# Patient Record
Sex: MALE | Race: WHITE | Employment: OTHER | URBAN - METROPOLITAN AREA
[De-identification: names, ages, dates, MRNs, and addresses within clinical notes are randomized per-mention and may not be internally consistent; named-entity substitution may affect disease eponyms.]

---

## 2017-11-15 PROBLEM — R07.9 CHEST PAIN: Status: ACTIVE | Noted: 2017-11-15

## 2017-12-10 PROBLEM — R07.9 CHEST PAIN: Status: RESOLVED | Noted: 2017-11-15 | Resolved: 2017-12-10

## 2017-12-10 PROBLEM — K21.9 GASTROESOPHAGEAL REFLUX DISEASE WITHOUT ESOPHAGITIS: Chronic | Status: ACTIVE | Noted: 2017-12-10

## 2017-12-10 PROBLEM — J40 BRONCHITIS: Status: ACTIVE | Noted: 2017-12-10

## 2017-12-10 PROBLEM — I10 ESSENTIAL HYPERTENSION: Chronic | Status: ACTIVE | Noted: 2017-12-10

## 2018-04-02 ENCOUNTER — HOSPITAL ENCOUNTER (EMERGENCY)
Age: 68
Discharge: HOME | End: 2018-04-03
Attending: EMERGENCY MEDICINE
Payer: MEDICARE

## 2018-04-02 DIAGNOSIS — R19.7 NAUSEA VOMITING AND DIARRHEA: ICD-10-CM

## 2018-04-02 DIAGNOSIS — K52.9 COLITIS, ACUTE: Primary | ICD-10-CM

## 2018-04-02 DIAGNOSIS — R11.2 NAUSEA VOMITING AND DIARRHEA: ICD-10-CM

## 2018-04-02 LAB
ALBUMIN SERPL-MCNC: 4.4 G/DL (ref 3.5–5.2)
ALP BLD-CCNC: 94 U/L (ref 40–129)
ALT SERPL-CCNC: 25 U/L (ref 0–40)
ANION GAP SERPL CALCULATED.3IONS-SCNC: 14 MMOL/L (ref 7–16)
AST SERPL-CCNC: 18 U/L (ref 0–39)
BASOPHILS ABSOLUTE: 0.09 E9/L (ref 0–0.2)
BASOPHILS RELATIVE PERCENT: 0.5 % (ref 0–2)
BILIRUB SERPL-MCNC: 0.6 MG/DL (ref 0–1.2)
BILIRUBIN DIRECT: <0.2 MG/DL (ref 0–0.3)
BILIRUBIN, INDIRECT: NORMAL MG/DL (ref 0–1)
BUN BLDV-MCNC: 21 MG/DL (ref 8–23)
CALCIUM SERPL-MCNC: 9.3 MG/DL (ref 8.6–10.2)
CHLORIDE BLD-SCNC: 100 MMOL/L (ref 98–107)
CO2: 28 MMOL/L (ref 22–29)
CREAT SERPL-MCNC: 0.9 MG/DL (ref 0.7–1.2)
EKG ATRIAL RATE: 95 BPM
EKG P AXIS: 48 DEGREES
EKG P-R INTERVAL: 142 MS
EKG Q-T INTERVAL: 334 MS
EKG QRS DURATION: 88 MS
EKG QTC CALCULATION (BAZETT): 419 MS
EKG R AXIS: 0 DEGREES
EKG T AXIS: 8 DEGREES
EKG VENTRICULAR RATE: 95 BPM
EOSINOPHILS ABSOLUTE: 0 E9/L (ref 0.05–0.5)
EOSINOPHILS RELATIVE PERCENT: 0.3 % (ref 0–6)
GFR AFRICAN AMERICAN: >60
GFR NON-AFRICAN AMERICAN: >60 ML/MIN/1.73
GLUCOSE BLD-MCNC: 157 MG/DL (ref 74–109)
HCT VFR BLD CALC: 45.2 % (ref 37–54)
HEMOGLOBIN: 15.4 G/DL (ref 12.5–16.5)
LACTIC ACID: 2.1 MMOL/L (ref 0.5–2.2)
LIPASE: 26 U/L (ref 13–60)
LYMPHOCYTES ABSOLUTE: 0.19 E9/L (ref 1.5–4)
LYMPHOCYTES RELATIVE PERCENT: 1 % (ref 20–42)
MCH RBC QN AUTO: 31.2 PG (ref 26–35)
MCHC RBC AUTO-ENTMCNC: 34.1 % (ref 32–34.5)
MCV RBC AUTO: 91.7 FL (ref 80–99.9)
MONOCYTES ABSOLUTE: 0.38 E9/L (ref 0.1–0.95)
MONOCYTES RELATIVE PERCENT: 2 % (ref 2–12)
NEUTROPHILS ABSOLUTE: 18.33 E9/L (ref 1.8–7.3)
NEUTROPHILS RELATIVE PERCENT: 96.5 % (ref 43–80)
OVALOCYTES: ABNORMAL
PDW BLD-RTO: 12.9 FL (ref 11.5–15)
PLATELET # BLD: 193 E9/L (ref 130–450)
PMV BLD AUTO: 10.3 FL (ref 7–12)
POIKILOCYTES: ABNORMAL
POLYCHROMASIA: ABNORMAL
POTASSIUM SERPL-SCNC: 4.1 MMOL/L (ref 3.5–5)
RBC # BLD: 4.93 E12/L (ref 3.8–5.8)
SODIUM BLD-SCNC: 142 MMOL/L (ref 132–146)
STOMATOCYTES: ABNORMAL
TARGET CELLS: ABNORMAL
TEAR DROP CELLS: ABNORMAL
TOTAL PROTEIN: 7.5 G/DL (ref 6.4–8.3)
TROPONIN: <0.01 NG/ML (ref 0–0.03)
WBC # BLD: 18.9 E9/L (ref 4.5–11.5)

## 2018-04-02 PROCEDURE — 84484 ASSAY OF TROPONIN QUANT: CPT

## 2018-04-02 PROCEDURE — 83605 ASSAY OF LACTIC ACID: CPT

## 2018-04-02 PROCEDURE — 93005 ELECTROCARDIOGRAM TRACING: CPT | Performed by: EMERGENCY MEDICINE

## 2018-04-02 PROCEDURE — G0328 FECAL BLOOD SCRN IMMUNOASSAY: HCPCS

## 2018-04-02 PROCEDURE — 87045 FECES CULTURE AEROBIC BACT: CPT

## 2018-04-02 PROCEDURE — 36415 COLL VENOUS BLD VENIPUNCTURE: CPT

## 2018-04-02 PROCEDURE — 83690 ASSAY OF LIPASE: CPT

## 2018-04-02 PROCEDURE — 2580000003 HC RX 258: Performed by: EMERGENCY MEDICINE

## 2018-04-02 PROCEDURE — 99284 EMERGENCY DEPT VISIT MOD MDM: CPT

## 2018-04-02 PROCEDURE — 87077 CULTURE AEROBIC IDENTIFY: CPT

## 2018-04-02 PROCEDURE — 80048 BASIC METABOLIC PNL TOTAL CA: CPT

## 2018-04-02 PROCEDURE — 82705 FATS/LIPIDS FECES QUAL: CPT

## 2018-04-02 PROCEDURE — 96374 THER/PROPH/DIAG INJ IV PUSH: CPT

## 2018-04-02 PROCEDURE — 80076 HEPATIC FUNCTION PANEL: CPT

## 2018-04-02 PROCEDURE — 87329 GIARDIA AG IA: CPT

## 2018-04-02 PROCEDURE — C9113 INJ PANTOPRAZOLE SODIUM, VIA: HCPCS | Performed by: EMERGENCY MEDICINE

## 2018-04-02 PROCEDURE — 6360000002 HC RX W HCPCS: Performed by: EMERGENCY MEDICINE

## 2018-04-02 PROCEDURE — 85025 COMPLETE CBC W/AUTO DIFF WBC: CPT

## 2018-04-02 PROCEDURE — 87425 ROTAVIRUS AG IA: CPT

## 2018-04-02 PROCEDURE — 87324 CLOSTRIDIUM AG IA: CPT

## 2018-04-02 RX ORDER — 0.9 % SODIUM CHLORIDE 0.9 %
1000 INTRAVENOUS SOLUTION INTRAVENOUS ONCE
Status: COMPLETED | OUTPATIENT
Start: 2018-04-02 | End: 2018-04-03

## 2018-04-02 RX ORDER — ONDANSETRON 2 MG/ML
4 INJECTION INTRAMUSCULAR; INTRAVENOUS ONCE
Status: COMPLETED | OUTPATIENT
Start: 2018-04-02 | End: 2018-04-02

## 2018-04-02 RX ORDER — PANTOPRAZOLE SODIUM 40 MG/10ML
40 INJECTION, POWDER, LYOPHILIZED, FOR SOLUTION INTRAVENOUS ONCE
Status: COMPLETED | OUTPATIENT
Start: 2018-04-02 | End: 2018-04-02

## 2018-04-02 RX ADMIN — SODIUM CHLORIDE 1000 ML: 900 INJECTION, SOLUTION INTRAVENOUS at 23:01

## 2018-04-02 RX ADMIN — PANTOPRAZOLE SODIUM 40 MG: 40 INJECTION, POWDER, FOR SOLUTION INTRAVENOUS at 23:02

## 2018-04-02 RX ADMIN — ONDANSETRON 4 MG: 2 INJECTION INTRAMUSCULAR; INTRAVENOUS at 23:02

## 2018-04-02 ASSESSMENT — ENCOUNTER SYMPTOMS
EYES NEGATIVE: 1
ABDOMINAL PAIN: 1
BLOOD IN STOOL: 0
NAUSEA: 1
WHEEZING: 0
CHEST TIGHTNESS: 0
COUGH: 0
SHORTNESS OF BREATH: 0
DIARRHEA: 1
ABDOMINAL DISTENTION: 1

## 2018-04-02 ASSESSMENT — PAIN SCALES - GENERAL: PAINLEVEL_OUTOF10: 2

## 2018-04-03 ENCOUNTER — APPOINTMENT (OUTPATIENT)
Dept: CT IMAGING | Age: 68
End: 2018-04-03
Payer: MEDICARE

## 2018-04-03 VITALS
DIASTOLIC BLOOD PRESSURE: 81 MMHG | RESPIRATION RATE: 24 BRPM | HEIGHT: 70 IN | TEMPERATURE: 97.9 F | BODY MASS INDEX: 29.35 KG/M2 | WEIGHT: 205 LBS | HEART RATE: 108 BPM | OXYGEN SATURATION: 95 % | SYSTOLIC BLOOD PRESSURE: 147 MMHG

## 2018-04-03 LAB
GIARDIA ANTIGEN STOOL: NORMAL
OCCULT BLOOD SCREENING: NORMAL
ROTAVIRUS ANTIGEN: NORMAL

## 2018-04-03 PROCEDURE — 74177 CT ABD & PELVIS W/CONTRAST: CPT

## 2018-04-03 PROCEDURE — 6370000000 HC RX 637 (ALT 250 FOR IP): Performed by: EMERGENCY MEDICINE

## 2018-04-03 PROCEDURE — 6360000004 HC RX CONTRAST MEDICATION: Performed by: RADIOLOGY

## 2018-04-03 RX ORDER — METRONIDAZOLE 500 MG/1
500 TABLET ORAL 2 TIMES DAILY
Qty: 20 TABLET | Refills: 0 | Status: SHIPPED | OUTPATIENT
Start: 2018-04-03 | End: 2018-04-13

## 2018-04-03 RX ORDER — CIPROFLOXACIN 500 MG/1
500 TABLET, FILM COATED ORAL ONCE
Status: COMPLETED | OUTPATIENT
Start: 2018-04-03 | End: 2018-04-03

## 2018-04-03 RX ORDER — CIPROFLOXACIN 500 MG/1
500 TABLET, FILM COATED ORAL 2 TIMES DAILY
Qty: 20 TABLET | Refills: 0 | Status: SHIPPED | OUTPATIENT
Start: 2018-04-03 | End: 2018-04-13

## 2018-04-03 RX ORDER — ONDANSETRON 4 MG/1
4 TABLET, FILM COATED ORAL EVERY 8 HOURS PRN
Qty: 20 TABLET | Refills: 0 | Status: SHIPPED | OUTPATIENT
Start: 2018-04-03

## 2018-04-03 RX ORDER — METRONIDAZOLE 500 MG/1
500 TABLET ORAL ONCE
Status: COMPLETED | OUTPATIENT
Start: 2018-04-03 | End: 2018-04-03

## 2018-04-03 RX ADMIN — IOPAMIDOL 80 ML: 755 INJECTION, SOLUTION INTRAVENOUS at 00:09

## 2018-04-03 RX ADMIN — CIPROFLOXACIN HYDROCHLORIDE 500 MG: 500 TABLET, FILM COATED ORAL at 02:25

## 2018-04-03 RX ADMIN — METRONIDAZOLE 500 MG: 500 TABLET, FILM COATED ORAL at 02:25

## 2018-04-04 LAB
FECAL NEUTRAL FAT: NORMAL
FECAL SPLIT FATS: NORMAL

## 2018-04-05 LAB
C DIFFICILE TOXIN, EIA: NORMAL
CULTURE, STOOL: NORMAL

## 2018-12-04 ENCOUNTER — HOSPITAL ENCOUNTER (OUTPATIENT)
Age: 68
Discharge: HOME | End: 2018-12-04
Payer: MEDICARE

## 2018-12-04 LAB
BASOPHILS ABSOLUTE: 0.03 E9/L (ref 0–0.2)
BASOPHILS RELATIVE PERCENT: 0.5 % (ref 0–2)
EOSINOPHILS ABSOLUTE: 0.16 E9/L (ref 0.05–0.5)
EOSINOPHILS RELATIVE PERCENT: 2.7 % (ref 0–6)
FERRITIN: 70 NG/ML
HCT VFR BLD CALC: 35.7 % (ref 37–54)
HEMOGLOBIN: 12.3 G/DL (ref 12.5–16.5)
IMMATURE GRANULOCYTES #: 0.05 E9/L
IMMATURE GRANULOCYTES %: 0.8 % (ref 0–5)
IRON SATURATION: 31 % (ref 20–55)
IRON: 84 MCG/DL (ref 59–158)
LYMPHOCYTES ABSOLUTE: 1.18 E9/L (ref 1.5–4)
LYMPHOCYTES RELATIVE PERCENT: 19.6 % (ref 20–42)
MCH RBC QN AUTO: 32.4 PG (ref 26–35)
MCHC RBC AUTO-ENTMCNC: 34.5 % (ref 32–34.5)
MCV RBC AUTO: 93.9 FL (ref 80–99.9)
MONOCYTES ABSOLUTE: 0.6 E9/L (ref 0.1–0.95)
MONOCYTES RELATIVE PERCENT: 10 % (ref 2–12)
NEUTROPHILS ABSOLUTE: 3.99 E9/L (ref 1.8–7.3)
NEUTROPHILS RELATIVE PERCENT: 66.4 % (ref 43–80)
PDW BLD-RTO: 13.3 FL (ref 11.5–15)
PLATELET # BLD: 156 E9/L (ref 130–450)
PMV BLD AUTO: 10 FL (ref 7–12)
RBC # BLD: 3.8 E12/L (ref 3.8–5.8)
TOTAL IRON BINDING CAPACITY: 270 MCG/DL (ref 250–450)
WBC # BLD: 6 E9/L (ref 4.5–11.5)

## 2018-12-04 PROCEDURE — 82728 ASSAY OF FERRITIN: CPT

## 2018-12-04 PROCEDURE — 85025 COMPLETE CBC W/AUTO DIFF WBC: CPT

## 2018-12-04 PROCEDURE — 36415 COLL VENOUS BLD VENIPUNCTURE: CPT

## 2018-12-04 PROCEDURE — 83550 IRON BINDING TEST: CPT

## 2018-12-04 PROCEDURE — 83540 ASSAY OF IRON: CPT

## 2019-09-07 ENCOUNTER — HOSPITAL ENCOUNTER (OUTPATIENT)
Age: 69
Discharge: HOME | End: 2019-09-07
Payer: MEDICARE

## 2019-09-07 LAB
ALBUMIN SERPL-MCNC: 4.3 G/DL (ref 3.5–5.2)
ALP BLD-CCNC: 86 U/L (ref 40–129)
ALT SERPL-CCNC: 31 U/L (ref 0–40)
ANION GAP SERPL CALCULATED.3IONS-SCNC: 13 MMOL/L (ref 7–16)
AST SERPL-CCNC: 22 U/L (ref 0–39)
BASOPHILS ABSOLUTE: 0.05 E9/L (ref 0–0.2)
BASOPHILS RELATIVE PERCENT: 0.8 % (ref 0–2)
BILIRUB SERPL-MCNC: 0.3 MG/DL (ref 0–1.2)
BUN BLDV-MCNC: 19 MG/DL (ref 8–23)
CALCIUM SERPL-MCNC: 9.2 MG/DL (ref 8.6–10.2)
CHLORIDE BLD-SCNC: 105 MMOL/L (ref 98–107)
CO2: 27 MMOL/L (ref 22–29)
CREAT SERPL-MCNC: 1 MG/DL (ref 0.7–1.2)
EOSINOPHILS ABSOLUTE: 0.22 E9/L (ref 0.05–0.5)
EOSINOPHILS RELATIVE PERCENT: 3.7 % (ref 0–6)
FERRITIN: 114 NG/ML
FOLATE: 17.1 NG/ML (ref 4.8–24.2)
GFR AFRICAN AMERICAN: >60
GFR NON-AFRICAN AMERICAN: >60 ML/MIN/1.73
GLUCOSE BLD-MCNC: 103 MG/DL (ref 74–99)
HCT VFR BLD CALC: 37.1 % (ref 37–54)
HEMOGLOBIN: 12.6 G/DL (ref 12.5–16.5)
IGA: 237 MG/DL (ref 70–400)
IMMATURE GRANULOCYTES #: 0.13 E9/L
IMMATURE GRANULOCYTES %: 2.2 % (ref 0–5)
IRON SATURATION: 32 % (ref 20–55)
IRON: 89 MCG/DL (ref 59–158)
LYMPHOCYTES ABSOLUTE: 1.12 E9/L (ref 1.5–4)
LYMPHOCYTES RELATIVE PERCENT: 18.6 % (ref 20–42)
MCH RBC QN AUTO: 31.8 PG (ref 26–35)
MCHC RBC AUTO-ENTMCNC: 34 % (ref 32–34.5)
MCV RBC AUTO: 93.7 FL (ref 80–99.9)
MONOCYTES ABSOLUTE: 0.58 E9/L (ref 0.1–0.95)
MONOCYTES RELATIVE PERCENT: 9.6 % (ref 2–12)
NEUTROPHILS ABSOLUTE: 3.92 E9/L (ref 1.8–7.3)
NEUTROPHILS RELATIVE PERCENT: 65.1 % (ref 43–80)
PDW BLD-RTO: 13.2 FL (ref 11.5–15)
PLATELET # BLD: 185 E9/L (ref 130–450)
PMV BLD AUTO: 10 FL (ref 7–12)
POTASSIUM SERPL-SCNC: 3.7 MMOL/L (ref 3.5–5)
RBC # BLD: 3.96 E12/L (ref 3.8–5.8)
SODIUM BLD-SCNC: 145 MMOL/L (ref 132–146)
TOTAL IRON BINDING CAPACITY: 280 MCG/DL (ref 250–450)
TOTAL PROTEIN: 7.3 G/DL (ref 6.4–8.3)
VITAMIN B-12: 439 PG/ML (ref 211–946)
WBC # BLD: 6 E9/L (ref 4.5–11.5)

## 2019-09-07 PROCEDURE — 80053 COMPREHEN METABOLIC PANEL: CPT

## 2019-09-07 PROCEDURE — 85025 COMPLETE CBC W/AUTO DIFF WBC: CPT

## 2019-09-07 PROCEDURE — 36415 COLL VENOUS BLD VENIPUNCTURE: CPT

## 2019-09-07 PROCEDURE — 82607 VITAMIN B-12: CPT

## 2019-09-07 PROCEDURE — 83550 IRON BINDING TEST: CPT

## 2019-09-07 PROCEDURE — 82728 ASSAY OF FERRITIN: CPT

## 2019-09-07 PROCEDURE — 82746 ASSAY OF FOLIC ACID SERUM: CPT

## 2019-09-07 PROCEDURE — 83540 ASSAY OF IRON: CPT

## 2019-09-07 PROCEDURE — 83516 IMMUNOASSAY NONANTIBODY: CPT

## 2019-09-07 PROCEDURE — 82784 ASSAY IGA/IGD/IGG/IGM EACH: CPT

## 2019-09-11 ENCOUNTER — HOSPITAL ENCOUNTER (EMERGENCY)
Age: 69
Discharge: HOME | End: 2019-09-11
Payer: MEDICARE

## 2019-09-11 ENCOUNTER — APPOINTMENT (OUTPATIENT)
Dept: GENERAL RADIOLOGY | Age: 69
End: 2019-09-11
Payer: MEDICARE

## 2019-09-11 VITALS
BODY MASS INDEX: 35.79 KG/M2 | RESPIRATION RATE: 20 BRPM | HEART RATE: 71 BPM | DIASTOLIC BLOOD PRESSURE: 80 MMHG | WEIGHT: 250 LBS | OXYGEN SATURATION: 96 % | SYSTOLIC BLOOD PRESSURE: 148 MMHG | TEMPERATURE: 98.5 F | HEIGHT: 70 IN

## 2019-09-11 DIAGNOSIS — M54.40 LOW BACK PAIN WITH SCIATICA, SCIATICA LATERALITY UNSPECIFIED, UNSPECIFIED BACK PAIN LATERALITY, UNSPECIFIED CHRONICITY: ICD-10-CM

## 2019-09-11 DIAGNOSIS — M62.838 SPASM OF MUSCLE: ICD-10-CM

## 2019-09-11 DIAGNOSIS — M54.30 SCIATICA, UNSPECIFIED LATERALITY: Primary | ICD-10-CM

## 2019-09-11 LAB
GLIADIN ANTIBODIES IGA: 8 UNITS (ref 0–19)
GLIADIN ANTIBODIES IGG: 3 UNITS (ref 0–19)
TISSUE TRANSGLUTAMINASE ANTIBODY: 1 U/ML (ref 0–5)
TISSUE TRANSGLUTAMINASE IGA: 1 U/ML (ref 0–3)

## 2019-09-11 PROCEDURE — 99283 EMERGENCY DEPT VISIT LOW MDM: CPT

## 2019-09-11 PROCEDURE — 72100 X-RAY EXAM L-S SPINE 2/3 VWS: CPT

## 2019-09-11 PROCEDURE — 6360000002 HC RX W HCPCS: Performed by: NURSE PRACTITIONER

## 2019-09-11 PROCEDURE — 96372 THER/PROPH/DIAG INJ SC/IM: CPT

## 2019-09-11 PROCEDURE — 6370000000 HC RX 637 (ALT 250 FOR IP): Performed by: NURSE PRACTITIONER

## 2019-09-11 RX ORDER — METHYLPREDNISOLONE 4 MG/1
TABLET ORAL
Qty: 21 TABLET | Status: SHIPPED | OUTPATIENT
Start: 2019-09-11 | End: 2019-09-17

## 2019-09-11 RX ORDER — CYCLOBENZAPRINE HCL 10 MG
10 TABLET ORAL 3 TIMES DAILY PRN
Qty: 30 TABLET | Refills: 0 | Status: SHIPPED | OUTPATIENT
Start: 2019-09-11 | End: 2019-09-21

## 2019-09-11 RX ORDER — CYCLOBENZAPRINE HCL 10 MG
10 TABLET ORAL ONCE
Status: COMPLETED | OUTPATIENT
Start: 2019-09-11 | End: 2019-09-11

## 2019-09-11 RX ORDER — IBUPROFEN 800 MG/1
800 TABLET ORAL EVERY 6 HOURS PRN
Qty: 20 TABLET | Refills: 0 | Status: ON HOLD | OUTPATIENT
Start: 2019-09-11 | End: 2021-01-26

## 2019-09-11 RX ORDER — OXYCODONE HYDROCHLORIDE AND ACETAMINOPHEN 5; 325 MG/1; MG/1
1 TABLET ORAL EVERY 6 HOURS PRN
Qty: 10 TABLET | Refills: 0 | Status: SHIPPED | OUTPATIENT
Start: 2019-09-11 | End: 2019-09-14

## 2019-09-11 RX ORDER — KETOROLAC TROMETHAMINE 30 MG/ML
60 INJECTION, SOLUTION INTRAMUSCULAR; INTRAVENOUS ONCE
Status: COMPLETED | OUTPATIENT
Start: 2019-09-11 | End: 2019-09-11

## 2019-09-11 RX ORDER — DEXAMETHASONE SODIUM PHOSPHATE 10 MG/ML
10 INJECTION INTRAMUSCULAR; INTRAVENOUS ONCE
Status: COMPLETED | OUTPATIENT
Start: 2019-09-11 | End: 2019-09-11

## 2019-09-11 RX ORDER — OXYCODONE HYDROCHLORIDE AND ACETAMINOPHEN 5; 325 MG/1; MG/1
1 TABLET ORAL ONCE
Status: COMPLETED | OUTPATIENT
Start: 2019-09-11 | End: 2019-09-11

## 2019-09-11 RX ADMIN — CYCLOBENZAPRINE HYDROCHLORIDE 10 MG: 10 TABLET, FILM COATED ORAL at 20:19

## 2019-09-11 RX ADMIN — DEXAMETHASONE SODIUM PHOSPHATE 10 MG: 10 INJECTION INTRAMUSCULAR; INTRAVENOUS at 20:19

## 2019-09-11 RX ADMIN — OXYCODONE HYDROCHLORIDE AND ACETAMINOPHEN 1 TABLET: 5; 325 TABLET ORAL at 20:19

## 2019-09-11 RX ADMIN — KETOROLAC TROMETHAMINE 60 MG: 30 INJECTION, SOLUTION INTRAMUSCULAR at 20:19

## 2019-09-11 ASSESSMENT — PAIN DESCRIPTION - LOCATION: LOCATION: BACK;LEG

## 2019-09-11 ASSESSMENT — PAIN SCALES - GENERAL
PAINLEVEL_OUTOF10: 9
PAINLEVEL_OUTOF10: 6

## 2019-09-11 ASSESSMENT — PAIN DESCRIPTION - PAIN TYPE: TYPE: ACUTE PAIN

## 2019-09-11 ASSESSMENT — PAIN DESCRIPTION - ORIENTATION: ORIENTATION: LEFT

## 2019-09-12 NOTE — ED PROVIDER NOTES
[facility]     Department of Emergency Medicine   ED  Provider Note  Admit Date/RoomTime: 9/11/2019  7:40 PM  ED Room: 24/24  Chief Complaint:   Back Pain and Leg Pain (left leg)    History of Present Illness   Source of history provided by:  patient. History/Exam Limitations: none. Staci Flor is a 78 y.o. old male who has a past medical history of:   Past Medical History:   Diagnosis Date    Acid reflux     Arthritis     Cancer ([address]     skin on top of head    History of cardiovascular stress test 11/2015    lexiscan    Hypertension     Sleep apnea     CPAP @ Night    presents to the emergency department by private vehicle, for acute and constant, aching, sharp and stabbing left greater than right lower lumbar spine pain with radiation, to the left thigh, below the left knee, for 3 day(s) prior to arrival.  The pain was caused by remote injury. He has a history of recurrent self limited episodes of low back pain in the past.   Since onset the symptoms have been constant and mild-moderate in severity. There has been no bowel or bladder incontinence associated with the pain. There have been associated symptoms of nothing additional and denies any weakness, numbness or incontinence. The the pain is aggraveated by nothing in particular and any movement and relieved by nothing. Nette Russostein ROS   Pertinent positives and negatives are stated within HPI, all other systems reviewed and are negative. Past Surgical History:   Procedure Laterality Date    CATARACT REMOVAL WITH IMPLANT  04 25 2012    right    CATARACT REMOVAL WITH IMPLANT  09-11-12    left eye    COLONOSCOPY      ENDOSCOPY, COLON, DIAGNOSTIC      HERNIA REPAIR      navel    JOINT REPLACEMENT      left hip    KNEE ARTHROSCOPY      SKIN BIOPSY     Social History:  reports that he has quit smoking. He has a 25.00 pack-year smoking history. He quit smokeless tobacco use about 37 years ago. He reports that he drinks alcohol.  He

## 2019-09-18 ENCOUNTER — HOSPITAL ENCOUNTER (OUTPATIENT)
Dept: GENERAL RADIOLOGY | Age: 69
Discharge: HOME | End: 2019-09-20
Payer: MEDICARE

## 2019-09-18 DIAGNOSIS — R13.10 DYSPHAGIA, UNSPECIFIED TYPE: ICD-10-CM

## 2019-09-18 PROCEDURE — 74230 X-RAY XM SWLNG FUNCJ C+: CPT

## 2019-09-18 PROCEDURE — 92526 ORAL FUNCTION THERAPY: CPT | Performed by: SPEECH-LANGUAGE PATHOLOGIST

## 2019-09-18 PROCEDURE — 2500000003 HC RX 250 WO HCPCS: Performed by: INTERNAL MEDICINE

## 2019-09-18 PROCEDURE — 92611 MOTION FLUOROSCOPY/SWALLOW: CPT | Performed by: SPEECH-LANGUAGE PATHOLOGIST

## 2019-09-18 RX ADMIN — BARIUM SULFATE 88 G: 960 POWDER, FOR SUSPENSION ORAL at 10:25

## 2019-09-18 RX ADMIN — BARIUM SULFATE 45 G: 0.6 CREAM ORAL at 10:25

## 2019-09-18 NOTE — PROGRESS NOTES
Speech Language Pathology      NAME:  Nancie Duncan  :  1950  DATE: 2019  ROOM:  Room/bed info not found    Patient Active Problem List   Diagnosis    Cataract, right eye    Left cataract    Generalized dermatochalasia    Ptosis    Bronchitis    Essential hypertension    Gastroesophageal reflux disease without esophagitis       Patient seen for swallow therapy 15 minutes. patient educated regarding results of MBSS. Reviewed current solid/liquid consistency diet recommendation for   Regular consistency solids with  thin liquids, and discussed compensatory strategies to ensure safe PO intake. Reviewed aspiration precautions. Discussed with patient that he may benefit from esophagram to determine esophageal function since recent EGD and MBSS do not provide and etiology for his chocking episodes. Patient and or family  indicated understanding of all information provided via satisfactory verbal response.     Urbano Espinal MSCCC/SLP  Speech Language Pathologist  [id number]

## 2019-09-18 NOTE — PROGRESS NOTES
SPEECH/LANGUAGE PATHOLOGY  VIDEOFLUOROSCOPIC STUDY OF SWALLOWING (MBS)    PATIENT NAME:  Cathy Mercer      :  1950      TODAY'S DATE:  2019  ROOM:  Room/bed info not found    SUMMARY OF EVALUATION     DYSPHAGIA DIAGNOSIS:  Functional swallow       DIET RECOMMENDATIONS:  Regular consistency solids with  thin liquids     FEEDING RECOMMENDATIONS:     Assistance level:  No assistance needed      Compensatory strategies recommended: No strategies are recommended at this time    THERAPY RECOMMENDATIONS:      Dysphagia therapy is not recommended    Patient may benefit from esophagram to rule out esophageal motility disorder as cause of his choking episodes. Patient report:  Choking     Diet prior to admit:    Regular consistency solids with  thin liquids               PROCEDURE       MBSImP Results:   Lip closure for intraoral bolus containment resulted in no labial escape. Tongue control during bolus hold maintained a cohesive bolus held between tongue to palate seal. Bolus preparation and mastication resulted in timely and efficient chewing and mashing. Bolus transport/lingual motion was with brisk tongue motion. Oral residue was not observed. Initiation of the pharyngeal swallow occurred as the bolus head reached the posterior angle of the mandibular ramus. Soft palate elevation resulted in no bolus between the soft palate and the pharyngeal wall. Laryngeal elevation demonstrated complete superior movement of the thyroid cartilage with complete approximation of the arytenoids to the epiglottic petiole. Anterior hyoid excursion demonstrated complete anterior movement. Epiglottic movement resulted in complete inversion. Laryngeal vestibule closure was complete, as indicated by no air or contrast within the laryngeal vestibule at the height of the swallow. Pharyngeal stripping wave was present and complete.  Pharyngeal contraction could not be determined due to logistical reasons not related to

## 2019-12-19 ENCOUNTER — HOSPITAL ENCOUNTER (OUTPATIENT)
Age: 69
Discharge: HOME | End: 2019-12-19
Payer: MEDICARE

## 2019-12-19 LAB
ALBUMIN SERPL-MCNC: 4.2 G/DL (ref 3.5–5.2)
ALP BLD-CCNC: 92 U/L (ref 40–129)
ALT SERPL-CCNC: 28 U/L (ref 0–40)
ANION GAP SERPL CALCULATED.3IONS-SCNC: 12 MMOL/L (ref 7–16)
AST SERPL-CCNC: 19 U/L (ref 0–39)
BASOPHILS ABSOLUTE: 0.03 E9/L (ref 0–0.2)
BASOPHILS RELATIVE PERCENT: 0.4 % (ref 0–2)
BILIRUB SERPL-MCNC: 0.3 MG/DL (ref 0–1.2)
BUN BLDV-MCNC: 16 MG/DL (ref 8–23)
CALCIUM SERPL-MCNC: 9 MG/DL (ref 8.6–10.2)
CHLORIDE BLD-SCNC: 103 MMOL/L (ref 98–107)
CO2: 27 MMOL/L (ref 22–29)
CREAT SERPL-MCNC: 0.9 MG/DL (ref 0.7–1.2)
EOSINOPHILS ABSOLUTE: 0.15 E9/L (ref 0.05–0.5)
EOSINOPHILS RELATIVE PERCENT: 2.1 % (ref 0–6)
FERRITIN: 113 NG/ML
FOLATE: 19.3 NG/ML (ref 4.8–24.2)
GFR AFRICAN AMERICAN: >60
GFR NON-AFRICAN AMERICAN: >60 ML/MIN/1.73
GLUCOSE FASTING: 119 MG/DL (ref 74–99)
HCT VFR BLD CALC: 36.1 % (ref 37–54)
HEMOGLOBIN: 12.3 G/DL (ref 12.5–16.5)
IMMATURE GRANULOCYTES #: 0.07 E9/L
IMMATURE GRANULOCYTES %: 1 % (ref 0–5)
IRON SATURATION: 27 % (ref 20–55)
IRON: 71 MCG/DL (ref 59–158)
LYMPHOCYTES ABSOLUTE: 1.2 E9/L (ref 1.5–4)
LYMPHOCYTES RELATIVE PERCENT: 17.1 % (ref 20–42)
MCH RBC QN AUTO: 31.5 PG (ref 26–35)
MCHC RBC AUTO-ENTMCNC: 34.1 % (ref 32–34.5)
MCV RBC AUTO: 92.3 FL (ref 80–99.9)
MONOCYTES ABSOLUTE: 0.67 E9/L (ref 0.1–0.95)
MONOCYTES RELATIVE PERCENT: 9.5 % (ref 2–12)
NEUTROPHILS ABSOLUTE: 4.91 E9/L (ref 1.8–7.3)
NEUTROPHILS RELATIVE PERCENT: 69.9 % (ref 43–80)
PDW BLD-RTO: 13.4 FL (ref 11.5–15)
PLATELET # BLD: 211 E9/L (ref 130–450)
PMV BLD AUTO: 9.9 FL (ref 7–12)
POTASSIUM SERPL-SCNC: 3.3 MMOL/L (ref 3.5–5)
RBC # BLD: 3.91 E12/L (ref 3.8–5.8)
SODIUM BLD-SCNC: 142 MMOL/L (ref 132–146)
T4 FREE: 0.99 NG/DL (ref 0.93–1.7)
TOTAL IRON BINDING CAPACITY: 266 MCG/DL (ref 250–450)
TOTAL PROTEIN: 7.2 G/DL (ref 6.4–8.3)
TSH SERPL DL<=0.05 MIU/L-ACNC: 1.94 UIU/ML (ref 0.27–4.2)
VITAMIN B-12: 564 PG/ML (ref 211–946)
WBC # BLD: 7 E9/L (ref 4.5–11.5)

## 2019-12-19 PROCEDURE — 86803 HEPATITIS C AB TEST: CPT

## 2019-12-19 PROCEDURE — 84443 ASSAY THYROID STIM HORMONE: CPT

## 2019-12-19 PROCEDURE — 82728 ASSAY OF FERRITIN: CPT

## 2019-12-19 PROCEDURE — 86704 HEP B CORE ANTIBODY TOTAL: CPT

## 2019-12-19 PROCEDURE — 36415 COLL VENOUS BLD VENIPUNCTURE: CPT

## 2019-12-19 PROCEDURE — 86706 HEP B SURFACE ANTIBODY: CPT

## 2019-12-19 PROCEDURE — 83550 IRON BINDING TEST: CPT

## 2019-12-19 PROCEDURE — 82607 VITAMIN B-12: CPT

## 2019-12-19 PROCEDURE — 85025 COMPLETE CBC W/AUTO DIFF WBC: CPT

## 2019-12-19 PROCEDURE — 83540 ASSAY OF IRON: CPT

## 2019-12-19 PROCEDURE — 82746 ASSAY OF FOLIC ACID SERUM: CPT

## 2019-12-19 PROCEDURE — 80053 COMPREHEN METABOLIC PANEL: CPT

## 2019-12-19 PROCEDURE — 87340 HEPATITIS B SURFACE AG IA: CPT

## 2019-12-19 PROCEDURE — 84439 ASSAY OF FREE THYROXINE: CPT

## 2019-12-20 LAB
HBV SURFACE AB TITR SER: NORMAL {TITER}
HEPATITIS B SURFACE ANTIGEN INTERPRETATION: NORMAL
HEPATITIS C ANTIBODY INTERPRETATION: NORMAL

## 2019-12-21 LAB — HEPATITIS B CORE TOTAL ANTIBODY: NONREACTIVE

## 2020-07-19 ENCOUNTER — APPOINTMENT (OUTPATIENT)
Dept: GENERAL RADIOLOGY | Age: 70
End: 2020-07-19
Payer: MEDICARE

## 2020-07-19 ENCOUNTER — HOSPITAL ENCOUNTER (EMERGENCY)
Age: 70
Discharge: HOME | End: 2020-07-19
Attending: EMERGENCY MEDICINE
Payer: MEDICARE

## 2020-07-19 VITALS
TEMPERATURE: 99.4 F | BODY MASS INDEX: 32.93 KG/M2 | HEIGHT: 70 IN | RESPIRATION RATE: 18 BRPM | SYSTOLIC BLOOD PRESSURE: 142 MMHG | DIASTOLIC BLOOD PRESSURE: 76 MMHG | HEART RATE: 65 BPM | WEIGHT: 230 LBS

## 2020-07-19 LAB
ALBUMIN SERPL-MCNC: 4.2 G/DL (ref 3.5–5.2)
ALP BLD-CCNC: 100 U/L (ref 40–129)
ALT SERPL-CCNC: 34 U/L (ref 0–40)
ANION GAP SERPL CALCULATED.3IONS-SCNC: 12 MMOL/L (ref 7–16)
AST SERPL-CCNC: 25 U/L (ref 0–39)
BACTERIA: NORMAL /HPF
BASOPHILS ABSOLUTE: 0 E9/L (ref 0–0.2)
BASOPHILS RELATIVE PERCENT: 0.7 % (ref 0–2)
BILIRUB SERPL-MCNC: 0.4 MG/DL (ref 0–1.2)
BILIRUBIN URINE: NEGATIVE
BLOOD, URINE: NEGATIVE
BUN BLDV-MCNC: 14 MG/DL (ref 8–23)
CALCIUM SERPL-MCNC: 9.2 MG/DL (ref 8.6–10.2)
CHLORIDE BLD-SCNC: 99 MMOL/L (ref 98–107)
CLARITY: CLEAR
CO2: 28 MMOL/L (ref 22–29)
COLOR: YELLOW
CREAT SERPL-MCNC: 1 MG/DL (ref 0.7–1.2)
EKG ATRIAL RATE: 61 BPM
EKG P AXIS: 58 DEGREES
EKG P-R INTERVAL: 164 MS
EKG Q-T INTERVAL: 412 MS
EKG QRS DURATION: 92 MS
EKG QTC CALCULATION (BAZETT): 414 MS
EKG R AXIS: 14 DEGREES
EKG T AXIS: 32 DEGREES
EKG VENTRICULAR RATE: 61 BPM
EOSINOPHILS ABSOLUTE: 0 E9/L (ref 0.05–0.5)
EOSINOPHILS RELATIVE PERCENT: 0.7 % (ref 0–6)
EPITHELIAL CELLS, UA: NORMAL /HPF
GFR AFRICAN AMERICAN: >60
GFR NON-AFRICAN AMERICAN: >60 ML/MIN/1.73
GLUCOSE BLD-MCNC: 139 MG/DL (ref 74–99)
GLUCOSE URINE: NEGATIVE MG/DL
HCT VFR BLD CALC: 37.4 % (ref 37–54)
HEMOGLOBIN: 12.9 G/DL (ref 12.5–16.5)
KETONES, URINE: NEGATIVE MG/DL
LACTIC ACID: 1.9 MMOL/L (ref 0.5–2.2)
LEUKOCYTE ESTERASE, URINE: NEGATIVE
LYMPHOCYTES ABSOLUTE: 0.68 E9/L (ref 1.5–4)
LYMPHOCYTES RELATIVE PERCENT: 15 % (ref 20–42)
MCH RBC QN AUTO: 31.8 PG (ref 26–35)
MCHC RBC AUTO-ENTMCNC: 34.5 % (ref 32–34.5)
MCV RBC AUTO: 92.1 FL (ref 80–99.9)
METAMYELOCYTES RELATIVE PERCENT: 0.9 % (ref 0–1)
MONOCYTES ABSOLUTE: 0.27 E9/L (ref 0.1–0.95)
MONOCYTES RELATIVE PERCENT: 6.2 % (ref 2–12)
NEUTROPHILS ABSOLUTE: 3.56 E9/L (ref 1.8–7.3)
NEUTROPHILS RELATIVE PERCENT: 77.9 % (ref 43–80)
NITRITE, URINE: NEGATIVE
PDW BLD-RTO: 13.2 FL (ref 11.5–15)
PH UA: 7 (ref 5–9)
PLATELET # BLD: 181 E9/L (ref 130–450)
PMV BLD AUTO: 9.9 FL (ref 7–12)
POTASSIUM SERPL-SCNC: 3.5 MMOL/L (ref 3.5–5)
PROTEIN UA: NEGATIVE MG/DL
RBC # BLD: 4.06 E12/L (ref 3.8–5.8)
RBC # BLD: NORMAL 10*6/UL
RBC UA: NORMAL /HPF (ref 0–2)
SARS-COV-2, NAAT: NOT DETECTED
SODIUM BLD-SCNC: 139 MMOL/L (ref 132–146)
SPECIFIC GRAVITY UA: 1.01 (ref 1–1.03)
TOTAL CK: 118 U/L (ref 20–200)
TOTAL PROTEIN: 7 G/DL (ref 6.4–8.3)
TROPONIN: <0.01 NG/ML (ref 0–0.03)
UROBILINOGEN, URINE: 0.2 E.U./DL
WBC # BLD: 4.5 E9/L (ref 4.5–11.5)
WBC UA: NORMAL /HPF (ref 0–5)

## 2020-07-19 PROCEDURE — 83605 ASSAY OF LACTIC ACID: CPT

## 2020-07-19 PROCEDURE — 99284 EMERGENCY DEPT VISIT MOD MDM: CPT

## 2020-07-19 PROCEDURE — 82550 ASSAY OF CK (CPK): CPT

## 2020-07-19 PROCEDURE — 80053 COMPREHEN METABOLIC PANEL: CPT

## 2020-07-19 PROCEDURE — 87040 BLOOD CULTURE FOR BACTERIA: CPT

## 2020-07-19 PROCEDURE — 81001 URINALYSIS AUTO W/SCOPE: CPT

## 2020-07-19 PROCEDURE — 93005 ELECTROCARDIOGRAM TRACING: CPT | Performed by: EMERGENCY MEDICINE

## 2020-07-19 PROCEDURE — 71045 X-RAY EXAM CHEST 1 VIEW: CPT

## 2020-07-19 PROCEDURE — 85025 COMPLETE CBC W/AUTO DIFF WBC: CPT

## 2020-07-19 PROCEDURE — 84484 ASSAY OF TROPONIN QUANT: CPT

## 2020-07-19 PROCEDURE — U0002 COVID-19 LAB TEST NON-CDC: HCPCS

## 2020-07-19 PROCEDURE — 93010 ELECTROCARDIOGRAM REPORT: CPT | Performed by: INTERNAL MEDICINE

## 2020-07-19 ASSESSMENT — PAIN DESCRIPTION - LOCATION: LOCATION: HEAD

## 2020-07-19 ASSESSMENT — PAIN DESCRIPTION - PAIN TYPE: TYPE: ACUTE PAIN

## 2020-07-19 ASSESSMENT — PAIN SCALES - GENERAL: PAINLEVEL_OUTOF10: 3

## 2020-07-19 NOTE — ED NOTES
Bed: 17  Expected date:   Expected time:   Means of arrival:   Comments:     Dami Carver RN  07/19/20 1112

## 2020-07-19 NOTE — ED PROVIDER NOTES
HPI:  7/19/20, Time: 11:40 AM EDT         Liang Root is a 78 y.o. male presenting to the ED for chills, ear pain, fatigue, body aches, exertional dyspnea and chest pain increased urination and fever 101, beginning yesterday. He was advised by the hotline to come in and get tested for Covid. He denies contacts with anyone with Covid, is retired and doesn't go out, doesn't travel. The complaint has been persistent, moderate in severity, and worsened by nothing but states he feels better now. Patient denies cough, congestion, edema, loss of smell or taste, headache, visual disturbances, focal paresthesias, focal weakness, abdominal pain, nausea, vomiting, diarrhea, constipation, dysuria, hematuria, trauma, neck or back pain or other complaints. ROS:   Pertinent positives and negatives are stated within HPI, all other systems reviewed and are negative.      --------------------------------------------- PAST HISTORY ---------------------------------------------  Past Medical History:  has a past medical history of Acid reflux, Arthritis, Cancer ([facility] [address] History of cardiovascular stress test, Hypertension, and Sleep apnea. Past Surgical History:  has a past surgical history that includes joint replacement; Knee arthroscopy; hernia repair; Colonoscopy; Endoscopy, colon, diagnostic; skin biopsy; Cataract removal with implant (04 25 2012); and Cataract removal with implant (09-11-12). Social History:  reports that he has quit smoking. He has a 25.00 pack-year smoking history. He quit smokeless tobacco use about 37 years ago. He reports current alcohol use. He reports that he does not use drugs. Family History: family history includes Cancer in his mother; Diabetes in his father; Heart Disease in his paternal grandfather. The patients home medications have been reviewed.     Allergies: Latex and Vicodin [hydrocodone-acetaminophen]        ---------------------------------------------------PHYSICAL EXAM--------------------------------------    Constitutional:  Well developed, well nourished, no acute distress, non-toxic appearance   Eyes:  PERRL, conjunctiva normal, EOMI  HENT:  Atraumatic, external ears normal, nose normal, oropharynx moist. Neck- normal range of motion, no nuchal rigidity, TMs clear and intact bilaterally. Airway patent without pharyngeal redness, exudate or swelling. Respiratory:  No respiratory distress, normal breath sounds, no rales, no wheezing   Cardiovascular:  Normal rate, normal rhythm, no murmurs, no gallops, no rubs. Radial and DP pulses 2+ bilaterally. GI:  Soft, nondistended, normal bowel sounds, nontender, no organomegaly, no mass, no rebound, no guarding   :  No costovertebral angle tenderness   Musculoskeletal:  No edema, no tenderness, no deformities. Back- no tenderness  Integument:  Well hydrated, no rash. Adequate perfusion. Lymphatic:  No cervical lymphadenopathy noted   Neurologic:  Alert & oriented x 3, CN 2-12 normal, normal gait, no focal deficits noted. Psychiatric:  Speech and behavior appropriate       -------------------------------------------------- RESULTS -------------------------------------------------  I have personally reviewed all laboratory and imaging results for this patient. Results are listed below.      LABS:  Results for orders placed or performed during the hospital encounter of 07/19/20   Troponin   Result Value Ref Range    Troponin <0.01 0.00 - 0.03 ng/mL   CK   Result Value Ref Range    Total  20 - 200 U/L   CBC auto differential   Result Value Ref Range    WBC 4.5 4.5 - 11.5 E9/L    RBC 4.06 3.80 - 5.80 E12/L    Hemoglobin 12.9 12.5 - 16.5 g/dL    Hematocrit 37.4 37.0 - 54.0 %    MCV 92.1 80.0 - 99.9 fL    MCH 31.8 26.0 - 35.0 pg    MCHC 34.5 32.0 - 34.5 %    RDW 13.2 11.5 - 15.0 fL    Platelets 363 020 - 352 E9/L    MPV 9.9 7.0 - 12.0 fL    Neutrophils % 77.9 43.0 - 80.0 %    Lymphocytes % 15.0 (L) 20.0 - 42.0 % Monocytes % 6.2 2.0 - 12.0 %    Eosinophils % 0.7 0.0 - 6.0 %    Basophils % 0.7 0.0 - 2.0 %    Neutrophils Absolute 3.56 1.80 - 7.30 E9/L    Lymphocytes Absolute 0.68 (L) 1.50 - 4.00 E9/L    Monocytes Absolute 0.27 0.10 - 0.95 E9/L    Eosinophils Absolute 0.00 (L) 0.05 - 0.50 E9/L    Basophils Absolute 0.00 0.00 - 0.20 E9/L    Metamyelocytes Relative 0.9 0.0 - 1.0 %    RBC Morphology Normal    Comprehensive Metabolic Panel   Result Value Ref Range    Sodium 139 132 - 146 mmol/L    Potassium 3.5 3.5 - 5.0 mmol/L    Chloride 99 98 - 107 mmol/L    CO2 28 22 - 29 mmol/L    Anion Gap 12 7 - 16 mmol/L    Glucose 139 (H) 74 - 99 mg/dL    BUN 14 8 - 23 mg/dL    CREATININE 1.0 0.7 - 1.2 mg/dL    GFR Non-African American >60 >=60 mL/min/1.73    GFR African American >60     Calcium 9.2 8.6 - 10.2 mg/dL    Total Protein 7.0 6.4 - 8.3 g/dL    Alb 4.2 3.5 - 5.2 g/dL    Total Bilirubin 0.4 0.0 - 1.2 mg/dL    Alkaline Phosphatase 100 40 - 129 U/L    ALT 34 0 - 40 U/L    AST 25 0 - 39 U/L   Urinalysis with Microscopic   Result Value Ref Range    Color, UA Yellow Straw/Yellow    Clarity, UA Clear Clear    Glucose, Ur Negative Negative mg/dL    Bilirubin Urine Negative Negative    Ketones, Urine Negative Negative mg/dL    Specific Gravity, UA 1.015 1.005 - 1.030    Blood, Urine Negative Negative    pH, UA 7.0 5.0 - 9.0    Protein, UA Negative Negative mg/dL    Urobilinogen, Urine 0.2 <2.0 E.U./dL    Nitrite, Urine Negative Negative    Leukocyte Esterase, Urine Negative Negative    WBC, UA NONE 0 - 5 /HPF    RBC, UA 0-1 0 - 2 /HPF    Epithelial Cells, UA RARE /HPF    Bacteria, UA NONE SEEN None Seen /HPF   Lactic Acid, Plasma   Result Value Ref Range    Lactic Acid 1.9 0.5 - 2.2 mmol/L   COVID-19   Result Value Ref Range    SARS-CoV-2, NAAT Not Detected Not Detected   EKG 12 Lead   Result Value Ref Range    Ventricular Rate 61 BPM    Atrial Rate 61 BPM    P-R Interval 164 ms    QRS Duration 92 ms    Q-T Interval 412 ms    QTc Calculation (Simón) 414 ms    P Axis 58 degrees    R Axis 14 degrees    T Axis 32 degrees       RADIOLOGY:  Interpreted by Radiologist.  XR CHEST PORTABLE   Final Result   No evidence of an acute process. EKG Interpretation  Interpreted by emergency department physician,    Time: 9457  Rhythm: normal sinus   Rate: 61  Axis: normal  Conduction: normal  ST Segments: no acute change  T Waves: no acute change  Clinical Impression: no acute changes  Comparison to prior EKG: None      ------------------------- NURSING NOTES AND VITALS REVIEWED ---------------------------   The nursing notes within the ED encounter and vital signs as below have been reviewed by myself. BP (!) 142/76   Pulse 65   Temp 99.4 °F (37.4 °C) (Oral)   Resp 18   Ht 5' 10\" (1.778 m)   Wt 230 lb (104.3 kg)   BMI 33.00 kg/m²   Oxygen Saturation Interpretation: Normal    The patients available past medical records and past encounters were reviewed. ------------------------------ ED COURSE/MEDICAL DECISION MAKING----------------------  Medications - No data to display        Procedures:  none      Medical Decision Making:    Neg acute on workup   Offered admission for chest pain rule out, states it not a chest pain thing and has appt with PCP coming up in a few days. States he thinks it was just the heat maybe and can't believe his covid test was negative. Will return if worse. Patient was explicitly instructed on specific signs and symptoms on which to return to the emergency room for. Patient was instructed to return to the ER for any new or worsening symptoms. Additional discharge instructions were given verbally. All questions were answered. Patient is comfortable and agreeable with discharge plan. Patient in no acute distress and non-toxic in appearance.        This patient's ED course included: re-evaluation prior to disposition and a personal history and physicial eaxmination    This patient has remained hemodynamically stable and improved during their ED course. Re-Evaluations:             Time: 3:12 PM EDT  Re-evaluation. Patients symptoms are improving  Repeat physical examination is unchanged        Consultations:             none    Critical Care: none        Counseling: The emergency provider has spoken with the patient and discussed todays results, in addition to providing specific details for the plan of care and counseling regarding the diagnosis and prognosis. Questions are answered at this time and they are agreeable with the plan.       --------------------------------- IMPRESSION AND DISPOSITION ---------------------------------    IMPRESSION  1. Viral syndrome    2.  Encounter for laboratory testing for COVID-19 virus        DISPOSITION  Disposition: Discharge to home  Patient condition is stable                  Lory Khan DO  07/19/20 2325

## 2020-07-24 LAB — BLOOD CULTURE, ROUTINE: NORMAL

## 2021-01-25 ENCOUNTER — HOSPITAL ENCOUNTER (INPATIENT)
Age: 71
LOS: 3 days | Discharge: HOME | DRG: 439 | End: 2021-01-29
Attending: EMERGENCY MEDICINE | Admitting: INTERNAL MEDICINE
Payer: MEDICARE

## 2021-01-25 ENCOUNTER — APPOINTMENT (OUTPATIENT)
Dept: CT IMAGING | Age: 71
DRG: 439 | End: 2021-01-25
Payer: MEDICARE

## 2021-01-25 DIAGNOSIS — K85.90 ACUTE PANCREATITIS, UNSPECIFIED COMPLICATION STATUS, UNSPECIFIED PANCREATITIS TYPE: Primary | ICD-10-CM

## 2021-01-25 DIAGNOSIS — R11.2 NON-INTRACTABLE VOMITING WITH NAUSEA, UNSPECIFIED VOMITING TYPE: ICD-10-CM

## 2021-01-25 DIAGNOSIS — R10.84 GENERALIZED ABDOMINAL PAIN: ICD-10-CM

## 2021-01-25 LAB
ALBUMIN SERPL-MCNC: 4.6 G/DL (ref 3.5–5.2)
ALP BLD-CCNC: 97 U/L (ref 40–129)
ALT SERPL-CCNC: 18 U/L (ref 0–40)
ANION GAP SERPL CALCULATED.3IONS-SCNC: 14 MMOL/L (ref 7–16)
AST SERPL-CCNC: 16 U/L (ref 0–39)
BACTERIA: ABNORMAL /HPF
BASOPHILS ABSOLUTE: 0.03 E9/L (ref 0–0.2)
BASOPHILS RELATIVE PERCENT: 0.2 % (ref 0–2)
BILIRUB SERPL-MCNC: 0.8 MG/DL (ref 0–1.2)
BILIRUBIN DIRECT: 0.2 MG/DL (ref 0–0.3)
BILIRUBIN URINE: NEGATIVE
BILIRUBIN, INDIRECT: 0.6 MG/DL (ref 0–1)
BLOOD, URINE: ABNORMAL
BUN BLDV-MCNC: 10 MG/DL (ref 8–23)
CALCIUM SERPL-MCNC: 10.4 MG/DL (ref 8.6–10.2)
CHLORIDE BLD-SCNC: 92 MMOL/L (ref 98–107)
CLARITY: CLEAR
CO2: 31 MMOL/L (ref 22–29)
COLOR: YELLOW
CREAT SERPL-MCNC: 0.7 MG/DL (ref 0.7–1.2)
EOSINOPHILS ABSOLUTE: 0 E9/L (ref 0.05–0.5)
EOSINOPHILS RELATIVE PERCENT: 0 % (ref 0–6)
EPITHELIAL CELLS, UA: ABNORMAL /HPF
GFR AFRICAN AMERICAN: >60
GFR NON-AFRICAN AMERICAN: >60 ML/MIN/1.73
GLUCOSE BLD-MCNC: 132 MG/DL (ref 74–99)
GLUCOSE URINE: NEGATIVE MG/DL
HCT VFR BLD CALC: 42.4 % (ref 37–54)
HEMOGLOBIN: 14.6 G/DL (ref 12.5–16.5)
IMMATURE GRANULOCYTES #: 0.1 E9/L
IMMATURE GRANULOCYTES %: 0.6 % (ref 0–5)
KETONES, URINE: 15 MG/DL
LACTIC ACID: 2.9 MMOL/L (ref 0.5–2.2)
LEUKOCYTE ESTERASE, URINE: NEGATIVE
LIPASE: 84 U/L (ref 13–60)
LYMPHOCYTES ABSOLUTE: 1.08 E9/L (ref 1.5–4)
LYMPHOCYTES RELATIVE PERCENT: 6.1 % (ref 20–42)
MCH RBC QN AUTO: 31.9 PG (ref 26–35)
MCHC RBC AUTO-ENTMCNC: 34.4 % (ref 32–34.5)
MCV RBC AUTO: 92.8 FL (ref 80–99.9)
MONOCYTES ABSOLUTE: 1.37 E9/L (ref 0.1–0.95)
MONOCYTES RELATIVE PERCENT: 7.8 % (ref 2–12)
NEUTROPHILS ABSOLUTE: 15.01 E9/L (ref 1.8–7.3)
NEUTROPHILS RELATIVE PERCENT: 85.3 % (ref 43–80)
NITRITE, URINE: NEGATIVE
PDW BLD-RTO: 13.1 FL (ref 11.5–15)
PH UA: 7 (ref 5–9)
PLATELET # BLD: 214 E9/L (ref 130–450)
PMV BLD AUTO: 10 FL (ref 7–12)
POTASSIUM REFLEX MAGNESIUM: 3.8 MMOL/L (ref 3.5–5)
PROTEIN UA: 100 MG/DL
RBC # BLD: 4.57 E12/L (ref 3.8–5.8)
RBC UA: ABNORMAL /HPF (ref 0–2)
SODIUM BLD-SCNC: 137 MMOL/L (ref 132–146)
SPECIFIC GRAVITY UA: 1.02 (ref 1–1.03)
TOTAL PROTEIN: 7.8 G/DL (ref 6.4–8.3)
UROBILINOGEN, URINE: 0.2 E.U./DL
WBC # BLD: 17.6 E9/L (ref 4.5–11.5)
WBC UA: ABNORMAL /HPF (ref 0–5)

## 2021-01-25 PROCEDURE — 81001 URINALYSIS AUTO W/SCOPE: CPT

## 2021-01-25 PROCEDURE — 80048 BASIC METABOLIC PNL TOTAL CA: CPT

## 2021-01-25 PROCEDURE — 96361 HYDRATE IV INFUSION ADD-ON: CPT

## 2021-01-25 PROCEDURE — 74177 CT ABD & PELVIS W/CONTRAST: CPT

## 2021-01-25 PROCEDURE — 83690 ASSAY OF LIPASE: CPT

## 2021-01-25 PROCEDURE — 83605 ASSAY OF LACTIC ACID: CPT

## 2021-01-25 PROCEDURE — 6360000002 HC RX W HCPCS: Performed by: EMERGENCY MEDICINE

## 2021-01-25 PROCEDURE — 96374 THER/PROPH/DIAG INJ IV PUSH: CPT

## 2021-01-25 PROCEDURE — 80076 HEPATIC FUNCTION PANEL: CPT

## 2021-01-25 PROCEDURE — 85025 COMPLETE CBC W/AUTO DIFF WBC: CPT

## 2021-01-25 PROCEDURE — 2580000003 HC RX 258: Performed by: EMERGENCY MEDICINE

## 2021-01-25 PROCEDURE — 99285 EMERGENCY DEPT VISIT HI MDM: CPT

## 2021-01-25 PROCEDURE — 6370000000 HC RX 637 (ALT 250 FOR IP): Performed by: EMERGENCY MEDICINE

## 2021-01-25 PROCEDURE — 96375 TX/PRO/DX INJ NEW DRUG ADDON: CPT

## 2021-01-25 PROCEDURE — 6360000004 HC RX CONTRAST MEDICATION: Performed by: RADIOLOGY

## 2021-01-25 RX ORDER — ONDANSETRON 2 MG/ML
4 INJECTION INTRAMUSCULAR; INTRAVENOUS ONCE
Status: COMPLETED | OUTPATIENT
Start: 2021-01-25 | End: 2021-01-25

## 2021-01-25 RX ORDER — HYDROCHLOROTHIAZIDE 12.5 MG/1
12.5 TABLET ORAL ONCE
Status: COMPLETED | OUTPATIENT
Start: 2021-01-25 | End: 2021-01-25

## 2021-01-25 RX ORDER — LOSARTAN POTASSIUM 100 MG/1
100 TABLET ORAL ONCE
Status: DISCONTINUED | OUTPATIENT
Start: 2021-01-25 | End: 2021-01-26 | Stop reason: SDUPTHER

## 2021-01-25 RX ORDER — FENTANYL CITRATE 50 UG/ML
50 INJECTION, SOLUTION INTRAMUSCULAR; INTRAVENOUS ONCE
Status: COMPLETED | OUTPATIENT
Start: 2021-01-25 | End: 2021-01-25

## 2021-01-25 RX ORDER — 0.9 % SODIUM CHLORIDE 0.9 %
1000 INTRAVENOUS SOLUTION INTRAVENOUS ONCE
Status: COMPLETED | OUTPATIENT
Start: 2021-01-25 | End: 2021-01-25

## 2021-01-25 RX ORDER — AMLODIPINE BESYLATE 5 MG/1
5 TABLET ORAL ONCE
Status: COMPLETED | OUTPATIENT
Start: 2021-01-25 | End: 2021-01-25

## 2021-01-25 RX ORDER — MORPHINE SULFATE 5 MG/ML
5 INJECTION, SOLUTION INTRAMUSCULAR; INTRAVENOUS ONCE
Status: COMPLETED | OUTPATIENT
Start: 2021-01-26 | End: 2021-01-25

## 2021-01-25 RX ADMIN — AMLODIPINE BESYLATE 5 MG: 5 TABLET ORAL at 23:38

## 2021-01-25 RX ADMIN — MORPHINE SULFATE 5 MG: 5 INJECTION, SOLUTION INTRAMUSCULAR; INTRAVENOUS at 23:58

## 2021-01-25 RX ADMIN — SODIUM CHLORIDE 1000 ML: 9 INJECTION, SOLUTION INTRAVENOUS at 22:23

## 2021-01-25 RX ADMIN — IOPAMIDOL 75 ML: 755 INJECTION, SOLUTION INTRAVENOUS at 23:16

## 2021-01-25 RX ADMIN — FENTANYL CITRATE 50 MCG: 50 INJECTION INTRAMUSCULAR; INTRAVENOUS at 22:23

## 2021-01-25 RX ADMIN — HYDROCHLOROTHIAZIDE 12.5 MG: 12.5 TABLET ORAL at 23:38

## 2021-01-25 RX ADMIN — ONDANSETRON 4 MG: 2 INJECTION INTRAMUSCULAR; INTRAVENOUS at 22:23

## 2021-01-25 ASSESSMENT — PAIN DESCRIPTION - PROGRESSION: CLINICAL_PROGRESSION: GRADUALLY IMPROVING

## 2021-01-25 ASSESSMENT — PAIN DESCRIPTION - PAIN TYPE: TYPE: ACUTE PAIN

## 2021-01-25 ASSESSMENT — PAIN DESCRIPTION - LOCATION: LOCATION: ABDOMEN

## 2021-01-25 ASSESSMENT — PAIN SCALES - GENERAL
PAINLEVEL_OUTOF10: 6
PAINLEVEL_OUTOF10: 8

## 2021-01-25 NOTE — Clinical Note
Patient Class: Inpatient [101]   REQUIRED: Diagnosis: Acute pancreatitis, unspecified complication status, unspecified pancreatitis type [[id number]]   Estimated Length of Stay: Estimated stay of more than 2 midnights   Admitting Provider: Angus Herrera [[id number]]   Telemetry Bed Required?: No

## 2021-01-26 ENCOUNTER — APPOINTMENT (OUTPATIENT)
Dept: MRI IMAGING | Age: 71
DRG: 439 | End: 2021-01-26
Payer: MEDICARE

## 2021-01-26 PROBLEM — K85.90 ACUTE PANCREATITIS: Status: ACTIVE | Noted: 2021-01-26

## 2021-01-26 LAB
ALBUMIN SERPL-MCNC: 4.1 G/DL (ref 3.5–5.2)
ALP BLD-CCNC: 88 U/L (ref 40–129)
ALT SERPL-CCNC: 17 U/L (ref 0–40)
ANION GAP SERPL CALCULATED.3IONS-SCNC: 12 MMOL/L (ref 7–16)
AST SERPL-CCNC: 15 U/L (ref 0–39)
BILIRUB SERPL-MCNC: 0.7 MG/DL (ref 0–1.2)
BUN BLDV-MCNC: 9 MG/DL (ref 8–23)
CALCIUM SERPL-MCNC: 9.5 MG/DL (ref 8.6–10.2)
CHLORIDE BLD-SCNC: 95 MMOL/L (ref 98–107)
CO2: 28 MMOL/L (ref 22–29)
CREAT SERPL-MCNC: 0.7 MG/DL (ref 0.7–1.2)
GFR AFRICAN AMERICAN: >60
GFR NON-AFRICAN AMERICAN: >60 ML/MIN/1.73
GLUCOSE BLD-MCNC: 110 MG/DL (ref 74–99)
LACTIC ACID: 1.2 MMOL/L (ref 0.5–2.2)
LACTIC ACID: 1.3 MMOL/L (ref 0.5–2.2)
LIPASE: 57 U/L (ref 13–60)
POTASSIUM SERPL-SCNC: 3.5 MMOL/L (ref 3.5–5)
SODIUM BLD-SCNC: 135 MMOL/L (ref 132–146)
TOTAL PROTEIN: 7.3 G/DL (ref 6.4–8.3)

## 2021-01-26 PROCEDURE — 94660 CPAP INITIATION&MGMT: CPT

## 2021-01-26 PROCEDURE — 83605 ASSAY OF LACTIC ACID: CPT

## 2021-01-26 PROCEDURE — G0378 HOSPITAL OBSERVATION PER HR: HCPCS

## 2021-01-26 PROCEDURE — 96372 THER/PROPH/DIAG INJ SC/IM: CPT

## 2021-01-26 PROCEDURE — 6360000002 HC RX W HCPCS: Performed by: INTERNAL MEDICINE

## 2021-01-26 PROCEDURE — 80053 COMPREHEN METABOLIC PANEL: CPT

## 2021-01-26 PROCEDURE — 96361 HYDRATE IV INFUSION ADD-ON: CPT

## 2021-01-26 PROCEDURE — 93005 ELECTROCARDIOGRAM TRACING: CPT | Performed by: INTERNAL MEDICINE

## 2021-01-26 PROCEDURE — 83690 ASSAY OF LIPASE: CPT

## 2021-01-26 PROCEDURE — 99222 1ST HOSP IP/OBS MODERATE 55: CPT | Performed by: INTERNAL MEDICINE

## 2021-01-26 PROCEDURE — 74183 MRI ABD W/O CNTR FLWD CNTR: CPT

## 2021-01-26 PROCEDURE — 2580000003 HC RX 258: Performed by: INTERNAL MEDICINE

## 2021-01-26 PROCEDURE — 6360000004 HC RX CONTRAST MEDICATION: Performed by: RADIOLOGY

## 2021-01-26 PROCEDURE — A9577 INJ MULTIHANCE: HCPCS | Performed by: RADIOLOGY

## 2021-01-26 PROCEDURE — 2580000003 HC RX 258: Performed by: EMERGENCY MEDICINE

## 2021-01-26 PROCEDURE — 6370000000 HC RX 637 (ALT 250 FOR IP): Performed by: INTERNAL MEDICINE

## 2021-01-26 PROCEDURE — 36415 COLL VENOUS BLD VENIPUNCTURE: CPT

## 2021-01-26 RX ORDER — SODIUM CHLORIDE 0.9 % (FLUSH) 0.9 %
10 SYRINGE (ML) INJECTION EVERY 12 HOURS SCHEDULED
Status: DISCONTINUED | OUTPATIENT
Start: 2021-01-26 | End: 2021-01-29 | Stop reason: HOSPADM

## 2021-01-26 RX ORDER — ACETAMINOPHEN 650 MG/1
650 SUPPOSITORY RECTAL EVERY 6 HOURS PRN
Status: DISCONTINUED | OUTPATIENT
Start: 2021-01-26 | End: 2021-01-29 | Stop reason: HOSPADM

## 2021-01-26 RX ORDER — POLYETHYLENE GLYCOL 3350 17 G/17G
17 POWDER, FOR SOLUTION ORAL
Status: DISCONTINUED | OUTPATIENT
Start: 2021-01-26 | End: 2021-01-26 | Stop reason: SDUPTHER

## 2021-01-26 RX ORDER — 0.9 % SODIUM CHLORIDE 0.9 %
1000 INTRAVENOUS SOLUTION INTRAVENOUS ONCE
Status: COMPLETED | OUTPATIENT
Start: 2021-01-26 | End: 2021-01-26

## 2021-01-26 RX ORDER — MORPHINE SULFATE 2 MG/ML
2 INJECTION, SOLUTION INTRAMUSCULAR; INTRAVENOUS EVERY 4 HOURS PRN
Status: DISCONTINUED | OUTPATIENT
Start: 2021-01-26 | End: 2021-01-26

## 2021-01-26 RX ORDER — POLYETHYLENE GLYCOL 3350 17 G/17G
17 POWDER, FOR SOLUTION ORAL
Status: DISCONTINUED | OUTPATIENT
Start: 2021-01-26 | End: 2021-01-27

## 2021-01-26 RX ORDER — BISACODYL 10 MG
10 SUPPOSITORY, RECTAL RECTAL ONCE
Status: COMPLETED | OUTPATIENT
Start: 2021-01-26 | End: 2021-01-26

## 2021-01-26 RX ORDER — LOSARTAN POTASSIUM 50 MG/1
TABLET ORAL
Status: DISPENSED
Start: 2021-01-26 | End: 2021-01-27

## 2021-01-26 RX ORDER — SODIUM CHLORIDE 9 MG/ML
INJECTION, SOLUTION INTRAVENOUS CONTINUOUS
Status: DISCONTINUED | OUTPATIENT
Start: 2021-01-26 | End: 2021-01-29 | Stop reason: HOSPADM

## 2021-01-26 RX ORDER — PANTOPRAZOLE SODIUM 40 MG/1
40 TABLET, DELAYED RELEASE ORAL DAILY
Status: DISCONTINUED | OUTPATIENT
Start: 2021-01-26 | End: 2021-01-29 | Stop reason: HOSPADM

## 2021-01-26 RX ORDER — MORPHINE SULFATE 4 MG/ML
4 INJECTION, SOLUTION INTRAMUSCULAR; INTRAVENOUS EVERY 4 HOURS PRN
Status: DISCONTINUED | OUTPATIENT
Start: 2021-01-26 | End: 2021-01-29 | Stop reason: HOSPADM

## 2021-01-26 RX ORDER — LOSARTAN POTASSIUM 50 MG/1
100 TABLET ORAL 2 TIMES DAILY
Status: DISCONTINUED | OUTPATIENT
Start: 2021-01-26 | End: 2021-01-28

## 2021-01-26 RX ORDER — ONDANSETRON 4 MG/1
4 TABLET, FILM COATED ORAL EVERY 8 HOURS PRN
Status: DISCONTINUED | OUTPATIENT
Start: 2021-01-26 | End: 2021-01-29 | Stop reason: HOSPADM

## 2021-01-26 RX ORDER — SODIUM CHLORIDE 0.9 % (FLUSH) 0.9 %
10 SYRINGE (ML) INJECTION PRN
Status: DISCONTINUED | OUTPATIENT
Start: 2021-01-26 | End: 2021-01-29 | Stop reason: HOSPADM

## 2021-01-26 RX ORDER — TAMSULOSIN HYDROCHLORIDE 0.4 MG/1
0.4 CAPSULE ORAL DAILY
Status: DISCONTINUED | OUTPATIENT
Start: 2021-01-26 | End: 2021-01-29 | Stop reason: HOSPADM

## 2021-01-26 RX ORDER — FLUTICASONE PROPIONATE 50 MCG
2 SPRAY, SUSPENSION (ML) NASAL NIGHTLY
Status: DISCONTINUED | OUTPATIENT
Start: 2021-01-26 | End: 2021-01-29 | Stop reason: HOSPADM

## 2021-01-26 RX ORDER — CARBAMAZEPINE 100 MG/1
100 TABLET, CHEWABLE ORAL NIGHTLY
Status: DISCONTINUED | OUTPATIENT
Start: 2021-01-26 | End: 2021-01-29 | Stop reason: HOSPADM

## 2021-01-26 RX ORDER — ALBUTEROL SULFATE 2.5 MG/3ML
2.5 SOLUTION RESPIRATORY (INHALATION) EVERY 6 HOURS PRN
Status: DISCONTINUED | OUTPATIENT
Start: 2021-01-26 | End: 2021-01-29 | Stop reason: HOSPADM

## 2021-01-26 RX ORDER — AMLODIPINE BESYLATE 5 MG/1
5 TABLET ORAL DAILY
Status: DISCONTINUED | OUTPATIENT
Start: 2021-01-26 | End: 2021-01-28

## 2021-01-26 RX ORDER — FINASTERIDE 5 MG/1
5 TABLET, FILM COATED ORAL NIGHTLY
Status: DISCONTINUED | OUTPATIENT
Start: 2021-01-26 | End: 2021-01-29 | Stop reason: HOSPADM

## 2021-01-26 RX ORDER — ACETAMINOPHEN 325 MG/1
650 TABLET ORAL EVERY 6 HOURS PRN
Status: DISCONTINUED | OUTPATIENT
Start: 2021-01-26 | End: 2021-01-29 | Stop reason: HOSPADM

## 2021-01-26 RX ADMIN — GADOBENATE DIMEGLUMINE 20 ML: 529 INJECTION, SOLUTION INTRAVENOUS at 10:15

## 2021-01-26 RX ADMIN — FINASTERIDE 5 MG: 5 TABLET, FILM COATED ORAL at 21:39

## 2021-01-26 RX ADMIN — AMLODIPINE BESYLATE 5 MG: 5 TABLET ORAL at 10:59

## 2021-01-26 RX ADMIN — LOSARTAN POTASSIUM 100 MG: 50 TABLET, FILM COATED ORAL at 10:59

## 2021-01-26 RX ADMIN — BISACODYL 10 MG: 10 SUPPOSITORY RECTAL at 16:35

## 2021-01-26 RX ADMIN — ACETAMINOPHEN 650 MG: 325 TABLET, FILM COATED ORAL at 16:36

## 2021-01-26 RX ADMIN — ENOXAPARIN SODIUM 40 MG: 40 INJECTION SUBCUTANEOUS at 10:59

## 2021-01-26 RX ADMIN — FLUTICASONE PROPIONATE 2 SPRAY: 50 SPRAY, METERED NASAL at 21:39

## 2021-01-26 RX ADMIN — LOSARTAN POTASSIUM 100 MG: 50 TABLET, FILM COATED ORAL at 21:38

## 2021-01-26 RX ADMIN — CARBAMAZEPINE 100 MG: 100 TABLET, CHEWABLE ORAL at 21:38

## 2021-01-26 RX ADMIN — POLYETHYLENE GLYCOL 3350 17 G: 17 POWDER, FOR SOLUTION ORAL at 11:05

## 2021-01-26 RX ADMIN — SODIUM CHLORIDE, PRESERVATIVE FREE 10 ML: 5 INJECTION INTRAVENOUS at 21:39

## 2021-01-26 RX ADMIN — ONDANSETRON HYDROCHLORIDE 4 MG: 4 TABLET, FILM COATED ORAL at 11:03

## 2021-01-26 RX ADMIN — SODIUM CHLORIDE: 9 INJECTION, SOLUTION INTRAVENOUS at 02:30

## 2021-01-26 RX ADMIN — PANTOPRAZOLE SODIUM 40 MG: 40 TABLET, DELAYED RELEASE ORAL at 10:59

## 2021-01-26 RX ADMIN — SODIUM CHLORIDE 1000 ML: 9 INJECTION, SOLUTION INTRAVENOUS at 00:05

## 2021-01-26 RX ADMIN — TAMSULOSIN HYDROCHLORIDE 0.4 MG: 0.4 CAPSULE ORAL at 10:59

## 2021-01-26 ASSESSMENT — ENCOUNTER SYMPTOMS
COUGH: 0
WHEEZING: 0
EYE REDNESS: 0
RHINORRHEA: 0
BLOOD IN STOOL: 0
BACK PAIN: 0
SORE THROAT: 0
NAUSEA: 1
CONSTIPATION: 0
VOMITING: 1
ABDOMINAL DISTENTION: 0
SHORTNESS OF BREATH: 0
DIARRHEA: 0
ABDOMINAL PAIN: 1

## 2021-01-26 ASSESSMENT — PAIN SCALES - GENERAL: PAINLEVEL_OUTOF10: 0

## 2021-01-26 ASSESSMENT — PAIN DESCRIPTION - PAIN TYPE: TYPE: ACUTE PAIN

## 2021-01-26 NOTE — ED PROVIDER NOTES
SABINA Tiwari is a 69 y.o. male with a PMHx significant for hypertension, JAMES, pancreatitis (\"in the early 90s,\" per the patient), GERD and arthritis who presents with 1 week of worsening abdominal pain and constipation. The patient stated that he had not had a bowel movement in 3 to 4 days and that his stool was minimal at that time. He denies any hematochezia or melena. The patient states that he was evaluated by his gastroenterologist today who was concerned for possible bowel obstruction. He sent the patient for a KUB and then directed into the ER. The patient describes his abdominal pain is generalized. He states that it does not radiate anywhere. He states that he has not taken anything for the discomfort. He does endorse nausea and one episode of NBNB emesis. The patient denies recent trauma, fever, chills, fatigue, HA, dizziness, vision changes, congestion, rhinorrhea, neck pain, chest pain, palpitations, hx of MI, SOB, cough, wheezing, diarrhea, hematemesis, hematochezia, melena, dysuria, hematuria, generalized weakness and paresthesias. The patient is currently taking no blood thinners. Tobacco Hx:   reports that he has quit smoking. He has a 25.00 pack-year smoking history. He quit smokeless tobacco use about 38 years ago. Alcohol Hx:   reports current alcohol use. Illicit Drug Hx:  Reports no history of illicit drug use. The history is provided by the patient. Last Tetanus (if applicable): N/A    Review of Systems   Constitutional: Positive for appetite change (Decreased secondary to abdominal pain, nausea and vomiting). Negative for chills, diaphoresis, fatigue and fever. HENT: Negative for congestion, rhinorrhea and sore throat. Eyes: Negative for redness. Respiratory: Negative for cough, shortness of breath and wheezing. Cardiovascular: Negative for chest pain, palpitations and leg swelling.    Gastrointestinal: Positive for abdominal pain, nausea and vomiting. Negative for abdominal distention, blood in stool, constipation and diarrhea. Genitourinary: Negative for difficulty urinating, dysuria, flank pain, frequency and hematuria. Musculoskeletal: Negative for arthralgias, back pain, myalgias and neck pain. Skin: Negative for rash and wound. Neurological: Negative for dizziness, syncope, speech difficulty, weakness, light-headedness, numbness and headaches. Physical Exam  Vitals signs and nursing note reviewed. Constitutional:       General: He is awake. He is not in acute distress. Appearance: He is well-developed. He is not diaphoretic. HENT:      Head: Normocephalic and atraumatic. Right Ear: External ear normal.      Left Ear: External ear normal.      Nose: Nose normal.      Mouth/Throat:      Mouth: Mucous membranes are dry. Pharynx: Oropharynx is clear. Eyes:      General: No scleral icterus. Right eye: No discharge. Left eye: No discharge. Extraocular Movements: Extraocular movements intact. Conjunctiva/sclera: Conjunctivae normal.   Neck:      Musculoskeletal: Normal range of motion and neck supple. No neck rigidity or muscular tenderness. Cardiovascular:      Rate and Rhythm: Normal rate and regular rhythm. Heart sounds: Normal heart sounds. No murmur. No friction rub. No gallop. Comments: Upper extremity and lower extremity distal pulses intact bilaterally +2/4  Pulmonary:      Effort: Pulmonary effort is normal. No respiratory distress. Breath sounds: Normal breath sounds. No wheezing, rhonchi or rales. Comments: Good air movement noted throughout. Chest:      Chest wall: No tenderness. Abdominal:      General: There is distension. Palpations: Abdomen is soft. There is no mass. Tenderness: There is abdominal tenderness (To mild palpation throughout). There is no right CVA tenderness, left CVA tenderness, guarding or rebound.       Hernia: No hernia is present. Comments: Negative BS throughout. Musculoskeletal: Normal range of motion. General: No tenderness or deformity. Right lower leg: No edema. Left lower leg: No edema. Lymphadenopathy:      Cervical: No cervical adenopathy. Skin:     General: Skin is warm and dry. Capillary Refill: Capillary refill takes less than 2 seconds. Findings: No erythema or rash. Neurological:      General: No focal deficit present. Mental Status: He is alert and oriented to person, place, and time. Sensory: No sensory deficit. Motor: No weakness. Coordination: Coordination normal.        --------------------------------------------- PAST HISTORY ---------------------------------------------  Past Medical History:  has a past medical history of Acid reflux, Arthritis, Cancer ([facility] [address] History of cardiovascular stress test, Hypertension, and Sleep apnea. Past Surgical History:  has a past surgical history that includes joint replacement; Knee arthroscopy; hernia repair; Colonoscopy; Endoscopy, colon, diagnostic; skin biopsy; Cataract removal with implant (04 25 2012); and Cataract removal with implant (09-11-12). Social History:  reports that he has quit smoking. He has a 25.00 pack-year smoking history. He quit smokeless tobacco use about 38 years ago. He reports current alcohol use. He reports that he does not use drugs. Family History: family history includes Cancer in his mother; Diabetes in his father; Heart Disease in his paternal grandfather. Home Meds: Not in a hospital admission. The patients home medications have been reviewed. Allergies: Latex and Vicodin [hydrocodone-acetaminophen]    ------------------------- NURSING NOTES AND VITALS REVIEWED ---------------------------  Date / Time Roomed:  1/25/2021  9:47 PM  ED Bed Assignment:  03/03    The nursing notes within the ED encounter and vital signs as below have been reviewed.    BP (!) 161/85 Pulse 71   Temp 98.5 °F (36.9 °C) (Oral)   Resp 20   Wt 228 lb (103.4 kg)   SpO2 94%   BMI 32.71 kg/m²   -------------------------------------------------- RESULTS / INTERVENTIONS -------------------------------------------------  All laboratory and radiology tests have been reviewed by this physician.     LABS:  Results for orders placed or performed during the hospital encounter of 01/25/21   CBC Auto Differential   Result Value Ref Range    WBC 17.6 (H) 4.5 - 11.5 E9/L    RBC 4.57 3.80 - 5.80 E12/L    Hemoglobin 14.6 12.5 - 16.5 g/dL    Hematocrit 42.4 37.0 - 54.0 %    MCV 92.8 80.0 - 99.9 fL    MCH 31.9 26.0 - 35.0 pg    MCHC 34.4 32.0 - 34.5 %    RDW 13.1 11.5 - 15.0 fL    Platelets 851 534 - 924 E9/L    MPV 10.0 7.0 - 12.0 fL    Neutrophils % 85.3 (H) 43.0 - 80.0 %    Immature Granulocytes % 0.6 0.0 - 5.0 %    Lymphocytes % 6.1 (L) 20.0 - 42.0 %    Monocytes % 7.8 2.0 - 12.0 %    Eosinophils % 0.0 0.0 - 6.0 %    Basophils % 0.2 0.0 - 2.0 %    Neutrophils Absolute 15.01 (H) 1.80 - 7.30 E9/L    Immature Granulocytes # 0.10 E9/L    Lymphocytes Absolute 1.08 (L) 1.50 - 4.00 E9/L    Monocytes Absolute 1.37 (H) 0.10 - 0.95 E9/L    Eosinophils Absolute 0.00 (L) 0.05 - 0.50 E9/L    Basophils Absolute 0.03 0.00 - 0.20 L6/O   Basic Metabolic Panel w/ Reflex to MG   Result Value Ref Range    Sodium 137 132 - 146 mmol/L    Potassium reflex Magnesium 3.8 3.5 - 5.0 mmol/L    Chloride 92 (L) 98 - 107 mmol/L    CO2 31 (H) 22 - 29 mmol/L    Anion Gap 14 7 - 16 mmol/L    Glucose 132 (H) 74 - 99 mg/dL    BUN 10 8 - 23 mg/dL    CREATININE 0.7 0.7 - 1.2 mg/dL    GFR Non-African American >60 >=60 mL/min/1.73    GFR African American >60     Calcium 10.4 (H) 8.6 - 10.2 mg/dL   Hepatic Function Panel   Result Value Ref Range    Total Protein 7.8 6.4 - 8.3 g/dL    Alb 4.6 3.5 - 5.2 g/dL    Alkaline Phosphatase 97 40 - 129 U/L    ALT 18 0 - 40 U/L    AST 16 0 - 39 U/L    Total Bilirubin 0.8 0.0 - 1.2 mg/dL    Bilirubin, Direct 0.2 0.0 - 0.3 mg/dL    Bilirubin, Indirect 0.6 0.0 - 1.0 mg/dL   Lipase   Result Value Ref Range    Lipase 84 (H) 13 - 60 U/L   Lactic Acid, Plasma   Result Value Ref Range    Lactic Acid 2.9 (H) 0.5 - 2.2 mmol/L   Urinalysis, reflex to microscopic   Result Value Ref Range    Color, UA Yellow Straw/Yellow    Clarity, UA Clear Clear    Glucose, Ur Negative Negative mg/dL    Bilirubin Urine Negative Negative    Ketones, Urine 15 (A) Negative mg/dL    Specific Gravity, UA 1.025 1.005 - 1.030    Blood, Urine TRACE (A) Negative    pH, UA 7.0 5.0 - 9.0    Protein,  (A) Negative mg/dL    Urobilinogen, Urine 0.2 <2.0 E.U./dL    Nitrite, Urine Negative Negative    Leukocyte Esterase, Urine Negative Negative   Microscopic Urinalysis   Result Value Ref Range    WBC, UA 1-3 0 - 5 /HPF    RBC, UA 2-5 0 - 2 /HPF    Epithelial Cells, UA RARE /HPF    Bacteria, UA FEW (A) None Seen /HPF       RADIOLOGY: Interpreted by Radiologist unless otherwise noted. CT ABDOMEN PELVIS W IV CONTRAST Additional Contrast? None   Final Result   Mild stranding and ill definition around and involving the pancreatic head   region. This could be mild acute pancreatitis. Correlate clinically. Cholelithiasis. Single small calcified gallstone visualized. Diverticulosis. No acute diverticulitis seen.           Oxygen Saturation Interpretation: Normal    Meds Given:  Medications   losartan (COZAAR) tablet 100 mg (100 mg Oral Not Given 1/25/21 2339)   0.9 % sodium chloride bolus (1,000 mLs Intravenous New Bag 1/26/21 0005)   0.9 % sodium chloride bolus (0 mLs Intravenous Stopped 1/25/21 2355)   ondansetron (ZOFRAN) injection 4 mg (4 mg Intravenous Given 1/25/21 2223)   fentaNYL (SUBLIMAZE) injection 50 mcg (50 mcg Intravenous Given 1/25/21 2223)   hydroCHLOROthiazide (HYDRODIURIL) tablet 12.5 mg (12.5 mg Oral Given 1/25/21 2338)   amLODIPine (NORVASC) tablet 5 mg (5 mg Oral Given 1/25/21 2338)   iopamidol (ISOVUE-370) 76 % injection 75 mL (75 mLs Intravenous Given 1/25/21 2316)   morphine sulfate (PF) injection 5 mg (5 mg Intravenous Given 1/25/21 7447)       Procedures:  No procedures performed. --------------------------------- PROGRESS NOTES / ADDITIONAL PROVIDER NOTES ---------------------------------  Consultations:  As outlined below. ED Course:    ED Course as of Jan 26 0100   Tue Jan 26, 2021   0008 Discussed the case with Dr. Law Elizabeth of internal medicine. He agrees to admit the patient for further evaluation management. [ML]   0014 On reevaluation, patient is resting comfortably in bed. I updated him on the status of his work-up and that it appears he has pancreatitis and possibly some other acute intra-abdominal process. I told him that our plan is to admit him to the hospital for further evaluation and management. The patient verbalized understanding and agree with that plan. At this time the patient is stable be safe for transport upstairs once a bed becomes available. [ML]      ED Course User Index  [ML] Raghu Hernandez, [address]: All results were discussed with the patient and I have provided specific details regarding the plan to admit the patient. The patient was stable at the time of admission and was without objective evidence of hemodynamic instability. He was seen in the emergency department by myself and the assigned attending physician, Dr. Christina Short, who agreed with the assessment, plan and decision to admit as laid out herein. The patient verbalized an understanding and agreement with the plan for admission. All questions were answered and the patient was deemed to be in stable condition at the time of transport. MDM:  Patient presented from home complaining of worsening abdominal pain, nausea, vomiting and constipation. On arrival, the patient was hypertensive with remaining vital signs within normal limits. Physical exam was as documented above. There was initial concern for possible bowel obstruction.   A work-up was initiated to further evaluate the patient's presenting complaints. Leukocytosis of 18,000 and elevated lactate were noted. LFTs were normal.  Lipase was elevated at 84. UA was not indicative of a urinary tract infection. CT of the abdomen and pelvis revealed findings consistent with acute pancreatitis. Cholelithiasis and diverticulosis were also noted. Patient was not felt to have a surgical abdomen. However, given his ongoing symptoms and CT findings, the decision was made to admit him to the hospital for further evaluation and management. Case was discussed with internal medicine who agreed to admit. The patient was given Zofran, fentanyl, morphine and IV fluid while in the department and his symptoms remained relatively well controlled. The patient was also given doses of his home blood pressure medications. He was stable at the time of his disposition. This patient's ED course included: a personal history and physicial examination, re-evaluation prior to disposition, multiple bedside re-evaluations, IV medications, cardiac monitoring and continuous pulse oximetry    Diagnosis:  1. Acute pancreatitis, unspecified complication status, unspecified pancreatitis type    2. Generalized abdominal pain    3. Non-intractable vomiting with nausea, unspecified vomiting type        Disposition:  Patient's disposition: Admit to telemetry  Patient's condition is stable. This patient was seen, examined and treated with Dr. Bernie Melton. All pertinent aspects of the patient's care were discussed with the attending physician.        Sammy Augustin DO  Resident  01/26/21 [id number]

## 2021-01-26 NOTE — PROGRESS NOTES
Patient admitted postmidnight:  Admission diagnosis is acute pancreatitis. MRCP suggestive of pancreatitis too. Etiology currently unknown. Pain control and IV fluid initiated. Advance diet as tolerated. Continue to monitor. Check lipid panel in the morning to see if there is any possible explanation for this. MRI is negative for choledocholithiasis.

## 2021-01-26 NOTE — CARE COORDINATION
SS Note: NO Covid testing. SW met with pt for transition of care. Stated he resides alone, bedroom and bathroom on first floor. Stated was independent prior to admission including driving. Stated owns CPAP from [address], has wheeled walker and other DME available from past surgeries. Stated history of [facility] and HHC, cannot recall name of agency. Stated PCP is Dr. Hiral Tenorio. Patient has prescription coverage, uses [employer]. Pt plans to return home upon dc, denies any needs including HHC, neighbor to transport home.   Electronically signed by VIRGINIA Domingo on 1/26/2021 at 1:55 PM

## 2021-01-26 NOTE — H&P
[facility] History and Physical      CHIEF COMPLAINT: Abdominal pain    History of Present Illness: 50-year-old male history of essential hypertension and JAMES. For the past few weeks he has noticed intermittent periumbilical abdominal pain that is mild, self-limiting. Not associated with food. He reports chronic constipation with no significant issues during this time. However for the past 3 to 4 days he has not had any bowel movement and did not feel that he passed any significant amount of gas. The night before presentation he developed worsening of his abdominal pain, states it radiates to his back and left shoulder. Associated with nausea and dry heaves. It is still intermittent but more frequent than before. Therefore presented to the ED for further evaluation. CT of the abdomen showed possible had of pancreas inflammation. He recalls having an episode of pancreatitis approximately 28 years ago that required hospitalization. No significant etiology was found. He was drinking more alcohol at that time therefore was attributed to possible alcohol abuse. Quit drinking for the past 2 years. Over the years he gets intermittent pain that he attributes to episodes of pancreatitis but are self-limiting and mild.     Informant(s) for H&P:pt    REVIEW OF SYSTEMS:  A comprehensive 14 point review of systems was negative except for: what is in the HPI    PMH:  Past Medical History:   Diagnosis Date    Acid reflux     Arthritis     Cancer ([address]     skin on top of head    History of cardiovascular stress test 11/2015    lexiscan    Hypertension     Sleep apnea     CPAP @ Night       Surgical History:  Past Surgical History:   Procedure Laterality Date    CATARACT REMOVAL WITH IMPLANT  04 25 2012    right    CATARACT REMOVAL WITH IMPLANT  09-11-12    left eye    COLONOSCOPY      ENDOSCOPY, COLON, DIAGNOSTIC      HERNIA REPAIR      navel    JOINT REPLACEMENT      left hip    KNEE ARTHROSCOPY      SKIN BIOPSY         Medications Prior to Admission:    Prior to Admission medications    Medication Sig Start Date End Date Taking?  Authorizing Provider   ibuprofen (ADVIL;MOTRIN) 800 MG tablet Take 1 tablet by mouth every 6 hours as needed for Pain 9/11/19 9/16/19  HUBER Moura CNP   ondansetron (ZOFRAN) 4 MG tablet Take 1 tablet by mouth every 8 hours as needed for Nausea or Vomiting 4/3/18   Antionette Cabrales DO   albuterol sulfate  (90 Base) MCG/ACT inhaler Inhale 2 puffs into the lungs every 4 hours as needed for Wheezing 12/10/17   Rogers Cruz MD   guaiFENesin (ALTARUSSIN) 100 MG/5ML syrup Take 10 mLs by mouth 4 times daily as needed for Cough or Congestion 12/10/17   Rogers Cruz MD   carBAMazepine (TEGRETOL) 100 MG chewable tablet Take 200 mg by mouth nightly     Historical Provider, MD   hydrochlorothiazide (HYDRODIURIL) 25 MG tablet Take 12.5 mg by mouth daily    Historical Provider, MD   meloxicam (MOBIC) 15 MG tablet Take 15 mg by mouth daily    Historical Provider, MD   amLODIPine (NORVASC) 5 MG tablet Take 5 mg by mouth daily    Historical Provider, MD   polyethylene glycol (GLYCOLAX) powder Take 17 g by mouth Daily with lunch    Historical Provider, MD   fluticasone (FLONASE) 50 MCG/ACT nasal spray 2 sprays by Nasal route nightly    Historical Provider, MD   sodium chloride (OCEAN, BABY AYR) 0.65 % nasal spray 2 sprays by Nasal route as needed for Congestion    Historical Provider, MD   polyvinyl alcohol (LUBRICANT DROPS) 1.4 % ophthalmic solution Place 1 drop into both eyes every hour as needed for Dry Eyes    Historical Provider, MD   influenza trivalent-split vaccine, PF, (FLUARIX/FLUZONE/AFLURIA) injection Inject 0.5 mLs into the muscle once    Historical Provider, MD   finasteride (PROSCAR) 5 MG tablet Take 5 mg by mouth nightly     Historical Provider, MD   pantoprazole (PROTONIX) 40 MG tablet Take 40 mg by mouth daily    Historical Provider, MD losartan (COZAAR) 100 MG tablet Take 100 mg by mouth 2 times daily     Historical Provider, MD   alfuzosin (UROXATRAL) 10 MG SR tablet Take 10 mg by mouth nightly     Historical Provider, MD   lubiprostone (AMITIZA) 8 MCG CAPS capsule Take 16 mcg by mouth daily     Historical Provider, MD       Allergies:    Latex and Vicodin [hydrocodone-acetaminophen]    Social History:    reports that he has quit smoking. He has a 25.00 pack-year smoking history. He quit smokeless tobacco use about 38 years ago. He reports current alcohol use. He reports that he does not use drugs. Family History:   family history includes Cancer in his mother; Diabetes in his father; Heart Disease in his paternal grandfather. PHYSICAL EXAM:  Vitals:  BP (!) 196/93   Pulse 69   Temp 98.5 °F (36.9 °C) (Oral)   Resp 20   Wt 228 lb (103.4 kg)   SpO2 94%   BMI 32.71 kg/m²   General Appearance: alert and oriented to person, place and time and in no acute distress  Skin: warm and dry, turgor not diminished  Head: normocephalic and atraumatic  Eyes: pupils equal, round, and reactive to light, extraocular eye movements intact, conjunctivae normal  Neck: neck supple and non tender without mass   Pulmonary/Chest: clear to auscultation bilaterally- no wheezes, rales or rhonchi, normal air movement, no respiratory distress  Cardiovascular: normal rate, normal S1 and S2 and no M/R/R  Abdomen: soft, no tenderness to palpation and after the exam complain of periumbilical pain  Extremities: no cyanosis, no clubbing and no edema  Neurologic: no cranial nerve deficit and speech normal        LABS:  Recent Labs     01/25/21  2214      K 3.8   CL 92*   CO2 31*   BUN 10   CREATININE 0.7   GLUCOSE 132*   CALCIUM 10.4*       Recent Labs     01/25/21  2214   WBC 17.6*   RBC 4.57   HGB 14.6   HCT 42.4   MCV 92.8   MCH 31.9   MCHC 34.4   RDW 13.1      MPV 10.0       No results for input(s): POCGLU in the last 72 hours.     Radiology:   CT ABDOMEN PELVIS W IV CONTRAST Additional Contrast? None   Final Result   Mild stranding and ill definition around and involving the pancreatic head   region. This could be mild acute pancreatitis. Correlate clinically. Cholelithiasis. Single small calcified gallstone visualized. Diverticulosis. No acute diverticulitis seen. EKG pending    ASSESSMENT:    Abdominal pain:?  Acute pancreatitis  Essential hypertension  Lactic acidosis  Constipation  JAMES    PLAN:  Abdominal pain:?  Acute pancreatitis versus from constipation  -Due to CT findings will check MRCP  -N.p.o., IV fluids, laxatives  -Repeat lactic acid. Abdominal exam relatively benign, doubt mesenteric ischemia  -Check EKG    JAMES: CPAP    Code Status: Full  DVT prophylaxis: Lovenox      NOTE: This report was transcribed using voice recognition software. Every effort was made to ensure accuracy; however, inadvertent computerized transcription errors may be present.   Electronically signed by Shon Tidwell MD on 1/26/2021 at 12:16 AM

## 2021-01-27 PROBLEM — K85.00 IDIOPATHIC ACUTE PANCREATITIS: Status: ACTIVE | Noted: 2021-01-27

## 2021-01-27 LAB
ALBUMIN SERPL-MCNC: 3.7 G/DL (ref 3.5–5.2)
ALP BLD-CCNC: 78 U/L (ref 40–129)
ALT SERPL-CCNC: 13 U/L (ref 0–40)
ANION GAP SERPL CALCULATED.3IONS-SCNC: 11 MMOL/L (ref 7–16)
AST SERPL-CCNC: 11 U/L (ref 0–39)
BILIRUB SERPL-MCNC: 0.5 MG/DL (ref 0–1.2)
BUN BLDV-MCNC: 10 MG/DL (ref 8–23)
CALCIUM SERPL-MCNC: 9 MG/DL (ref 8.6–10.2)
CHLORIDE BLD-SCNC: 101 MMOL/L (ref 98–107)
CHOLESTEROL, TOTAL: 173 MG/DL (ref 0–199)
CO2: 27 MMOL/L (ref 22–29)
CREAT SERPL-MCNC: 0.8 MG/DL (ref 0.7–1.2)
EKG ATRIAL RATE: 72 BPM
EKG P AXIS: 44 DEGREES
EKG P-R INTERVAL: 148 MS
EKG Q-T INTERVAL: 392 MS
EKG QRS DURATION: 96 MS
EKG QTC CALCULATION (BAZETT): 429 MS
EKG R AXIS: 8 DEGREES
EKG T AXIS: 53 DEGREES
EKG VENTRICULAR RATE: 72 BPM
GFR AFRICAN AMERICAN: >60
GFR NON-AFRICAN AMERICAN: >60 ML/MIN/1.73
GLUCOSE BLD-MCNC: 96 MG/DL (ref 74–99)
HDLC SERPL-MCNC: 58 MG/DL
LDL CHOLESTEROL CALCULATED: 97 MG/DL (ref 0–99)
LIPASE: 33 U/L (ref 13–60)
POTASSIUM SERPL-SCNC: 3.2 MMOL/L (ref 3.5–5)
SODIUM BLD-SCNC: 139 MMOL/L (ref 132–146)
TOTAL PROTEIN: 6.8 G/DL (ref 6.4–8.3)
TRIGL SERPL-MCNC: 92 MG/DL (ref 0–149)
VLDLC SERPL CALC-MCNC: 18 MG/DL

## 2021-01-27 PROCEDURE — 94660 CPAP INITIATION&MGMT: CPT

## 2021-01-27 PROCEDURE — 96372 THER/PROPH/DIAG INJ SC/IM: CPT

## 2021-01-27 PROCEDURE — 2580000003 HC RX 258: Performed by: INTERNAL MEDICINE

## 2021-01-27 PROCEDURE — 83690 ASSAY OF LIPASE: CPT

## 2021-01-27 PROCEDURE — 6370000000 HC RX 637 (ALT 250 FOR IP): Performed by: INTERNAL MEDICINE

## 2021-01-27 PROCEDURE — 96361 HYDRATE IV INFUSION ADD-ON: CPT

## 2021-01-27 PROCEDURE — 6370000000 HC RX 637 (ALT 250 FOR IP): Performed by: HOSPITALIST

## 2021-01-27 PROCEDURE — 80053 COMPREHEN METABOLIC PANEL: CPT

## 2021-01-27 PROCEDURE — 6360000002 HC RX W HCPCS: Performed by: INTERNAL MEDICINE

## 2021-01-27 PROCEDURE — 80061 LIPID PANEL: CPT

## 2021-01-27 PROCEDURE — 1200000000 HC SEMI PRIVATE

## 2021-01-27 PROCEDURE — 36415 COLL VENOUS BLD VENIPUNCTURE: CPT

## 2021-01-27 RX ORDER — DOCUSATE SODIUM 100 MG/1
100 CAPSULE, LIQUID FILLED ORAL 2 TIMES DAILY
Status: DISCONTINUED | OUTPATIENT
Start: 2021-01-27 | End: 2021-01-28

## 2021-01-27 RX ORDER — POLYETHYLENE GLYCOL 3350 17 G/17G
17 POWDER, FOR SOLUTION ORAL 2 TIMES DAILY
Status: DISCONTINUED | OUTPATIENT
Start: 2021-01-27 | End: 2021-01-28

## 2021-01-27 RX ORDER — POTASSIUM CHLORIDE 20 MEQ/1
40 TABLET, EXTENDED RELEASE ORAL ONCE
Status: COMPLETED | OUTPATIENT
Start: 2021-01-27 | End: 2021-01-27

## 2021-01-27 RX ADMIN — TAMSULOSIN HYDROCHLORIDE 0.4 MG: 0.4 CAPSULE ORAL at 09:46

## 2021-01-27 RX ADMIN — FLUTICASONE PROPIONATE 2 SPRAY: 50 SPRAY, METERED NASAL at 21:25

## 2021-01-27 RX ADMIN — POTASSIUM CHLORIDE 40 MEQ: 1500 TABLET, EXTENDED RELEASE ORAL at 11:09

## 2021-01-27 RX ADMIN — SODIUM CHLORIDE: 9 INJECTION, SOLUTION INTRAVENOUS at 21:24

## 2021-01-27 RX ADMIN — AMLODIPINE BESYLATE 5 MG: 5 TABLET ORAL at 09:46

## 2021-01-27 RX ADMIN — DOCUSATE SODIUM 100 MG: 100 CAPSULE, LIQUID FILLED ORAL at 13:38

## 2021-01-27 RX ADMIN — LOSARTAN POTASSIUM 100 MG: 50 TABLET, FILM COATED ORAL at 21:24

## 2021-01-27 RX ADMIN — DOCUSATE SODIUM 100 MG: 100 CAPSULE, LIQUID FILLED ORAL at 21:24

## 2021-01-27 RX ADMIN — FINASTERIDE 5 MG: 5 TABLET, FILM COATED ORAL at 21:24

## 2021-01-27 RX ADMIN — POLYETHYLENE GLYCOL 3350 17 G: 17 POWDER, FOR SOLUTION ORAL at 11:09

## 2021-01-27 RX ADMIN — CARBAMAZEPINE 100 MG: 100 TABLET, CHEWABLE ORAL at 21:24

## 2021-01-27 RX ADMIN — PANTOPRAZOLE SODIUM 40 MG: 40 TABLET, DELAYED RELEASE ORAL at 09:46

## 2021-01-27 RX ADMIN — LOSARTAN POTASSIUM 100 MG: 50 TABLET, FILM COATED ORAL at 09:46

## 2021-01-27 RX ADMIN — POLYETHYLENE GLYCOL 3350 17 G: 17 POWDER, FOR SOLUTION ORAL at 21:24

## 2021-01-27 RX ADMIN — SODIUM CHLORIDE: 9 INJECTION, SOLUTION INTRAVENOUS at 09:45

## 2021-01-27 RX ADMIN — ENOXAPARIN SODIUM 40 MG: 40 INJECTION SUBCUTANEOUS at 09:46

## 2021-01-27 ASSESSMENT — PAIN DESCRIPTION - LOCATION: LOCATION: ABDOMEN

## 2021-01-27 ASSESSMENT — PAIN DESCRIPTION - FREQUENCY: FREQUENCY: CONTINUOUS

## 2021-01-27 ASSESSMENT — PAIN SCALES - GENERAL: PAINLEVEL_OUTOF10: 0

## 2021-01-27 ASSESSMENT — PAIN DESCRIPTION - ORIENTATION: ORIENTATION: RIGHT;ANTERIOR;MID

## 2021-01-27 NOTE — CARE COORDINATION
SS Note: NO Covid testing. SW met with pt yesterday, plans to return home upon dc, denies any needs including HHC, neighbor to transport home.  Electronically signed by VIRGINIA Franco on 1/27/2021 at 12:22 PM

## 2021-01-27 NOTE — PROGRESS NOTES
Department of Internal Medicine  General Internal Medicine  Attending Progress Note      SUBJECTIVE:    Patient reports that his abdominal pain is better today. Denied fever and chills. Noted able to tolerate albuterol popsicle both midway through he he felt full and had to stop eating. He is open to trying liquid diet and seeing if he is able to tolerate it. He is interested in seeing GI. Current Facility-Administered Medications: potassium chloride (KLOR-CON M) extended release tablet 40 mEq, 40 mEq, Oral, Once  albuterol (PROVENTIL) nebulizer solution 2.5 mg, 2.5 mg, Nebulization, Q6H PRN  tamsulosin (FLOMAX) capsule 0.4 mg, 0.4 mg, Oral, Daily  amLODIPine (NORVASC) tablet 5 mg, 5 mg, Oral, Daily  carBAMazepine (TEGRETOL) chewable tablet 100 mg, 100 mg, Oral, Nightly  finasteride (PROSCAR) tablet 5 mg, 5 mg, Oral, Nightly  fluticasone (FLONASE) 50 MCG/ACT nasal spray 2 spray, 2 spray, Nasal, Nightly  losartan (COZAAR) tablet 100 mg, 100 mg, Oral, BID  ondansetron (ZOFRAN) tablet 4 mg, 4 mg, Oral, Q8H PRN  pantoprazole (PROTONIX) tablet 40 mg, 40 mg, Oral, Daily  0.9 % sodium chloride infusion, , Intravenous, Continuous  sodium chloride flush 0.9 % injection 10 mL, 10 mL, Intravenous, 2 times per day  sodium chloride flush 0.9 % injection 10 mL, 10 mL, Intravenous, PRN  enoxaparin (LOVENOX) injection 40 mg, 40 mg, Subcutaneous, Daily  polyethylene glycol (GLYCOLAX) packet 17 g, 17 g, Oral, Lunch  acetaminophen (TYLENOL) tablet 650 mg, 650 mg, Oral, Q6H PRN **OR** acetaminophen (TYLENOL) suppository 650 mg, 650 mg, Rectal, Q6H PRN  morphine injection 4 mg, 4 mg, Intravenous, Q4H PRN    OBJECTIVE      Medications    Current Facility-Administered Medications: potassium chloride (KLOR-CON M) extended release tablet 40 mEq, 40 mEq, Oral, Once  albuterol (PROVENTIL) nebulizer solution 2.5 mg, 2.5 mg, Nebulization, Q6H PRN  tamsulosin (FLOMAX) capsule 0.4 mg, 0.4 mg, Oral, Daily  amLODIPine (NORVASC) tablet 5 mg, 5 mg, Oral, Daily  carBAMazepine (TEGRETOL) chewable tablet 100 mg, 100 mg, Oral, Nightly  finasteride (PROSCAR) tablet 5 mg, 5 mg, Oral, Nightly  fluticasone (FLONASE) 50 MCG/ACT nasal spray 2 spray, 2 spray, Nasal, Nightly  losartan (COZAAR) tablet 100 mg, 100 mg, Oral, BID  ondansetron (ZOFRAN) tablet 4 mg, 4 mg, Oral, Q8H PRN  pantoprazole (PROTONIX) tablet 40 mg, 40 mg, Oral, Daily  0.9 % sodium chloride infusion, , Intravenous, Continuous  sodium chloride flush 0.9 % injection 10 mL, 10 mL, Intravenous, 2 times per day  sodium chloride flush 0.9 % injection 10 mL, 10 mL, Intravenous, PRN  enoxaparin (LOVENOX) injection 40 mg, 40 mg, Subcutaneous, Daily  polyethylene glycol (GLYCOLAX) packet 17 g, 17 g, Oral, Lunch  acetaminophen (TYLENOL) tablet 650 mg, 650 mg, Oral, Q6H PRN **OR** acetaminophen (TYLENOL) suppository 650 mg, 650 mg, Rectal, Q6H PRN  morphine injection 4 mg, 4 mg, Intravenous, Q4H PRN  Physical    VITALS:  /72   Pulse 80   Temp 98.1 °F (36.7 °C) (Oral)   Resp 20   Ht 5' 10\" (1.778 m)   Wt 229 lb 4.8 oz (104 kg)   SpO2 97%   BMI 32.90 kg/m²   CONSTITUTIONAL:  awake, alert, cooperative, no apparent distress, and appears stated age  EYES:  Lids and lashes normal, pupils equal, round and reactive to light, extra ocular muscles intact, sclera clear, conjunctiva normal  ENT:  Normocephalic, without obvious abnormality, atraumatic, sinuses nontender on palpation, external ears without lesions, oral pharynx with moist mucus membranes, tonsils without erythema or exudates, gums normal and good dentition.   NECK:  Supple, symmetrical, trachea midline, no adenopathy, thyroid symmetric, not enlarged and no tenderness, skin normal  HEMATOLOGIC/LYMPHATICS:  no cervical lymphadenopathy  BACK:  Symmetric, no curvature, spinous processes are non-tender on palpation, paraspinous muscles are non-tender on palpation, no costal vertebral tenderness  LUNGS:  No increased work of breathing, good air exchange, clear to auscultation bilaterally, no crackles or wheezing  CARDIOVASCULAR:  Normal apical impulse, regular rate and rhythm, normal S1 and S2, no S3 or S4, and no murmur noted  ABDOMEN:  Mild tenderness on palpation. Active bowel sound noted  MUSCULOSKELETAL:  there is no redness, warmth, or swelling of the joints  NEUROLOGIC:  Awake, alert, oriented to name, place and time. Cranial nerves II-XII are grossly intact. Motor is 5 out of 5 bilaterally. Cerebellar finger to nose, heel to shin intact. Sensory is intact. Babinski down going, Romberg negative, and gait is normal.  SKIN:  no bruising or bleeding  Data    CBC:   Lab Results   Component Value Date    WBC 17.6 01/25/2021    RBC 4.57 01/25/2021    HGB 14.6 01/25/2021    HCT 42.4 01/25/2021    MCV 92.8 01/25/2021    MCH 31.9 01/25/2021    MCHC 34.4 01/25/2021    RDW 13.1 01/25/2021     01/25/2021    MPV 10.0 01/25/2021     BMP:    Lab Results   Component Value Date     01/27/2021    K 3.2 01/27/2021    K 3.8 01/25/2021     01/27/2021    CO2 27 01/27/2021    BUN 10 01/27/2021    LABALBU 3.7 01/27/2021    LABALBU 4.8 05/23/2012    CREATININE 0.8 01/27/2021    CALCIUM 9.0 01/27/2021    GFRAA >60 01/27/2021    LABGLOM >60 01/27/2021    GLUCOSE 96 01/27/2021    GLUCOSE 95 05/23/2012       ASSESSMENT AND PLAN        1. Acute pancreatitis:  Suspect that this is idiopathic  Continue IV fluid and pain management  Advancing diet liquid diet  MRI suggestive of acute pancreatitis  GI consult placed    2. Hypertension essential:  Good control on current medication. 3.  JAMES:  Continue home CPAP/BiPAP    4.   Hypokalemia:  Replace with p.o. potassium

## 2021-01-27 NOTE — CONSULTS
Ray Cain M.D. [facility]  Dr. Anahi Chan M.D.,  Dr. Veronique Monge M.D.,  Dr. Matilda Deleon D.O.,  Dr. Gemma Alicea D.O. ,  Dr. Tiara Medrano M.D.,  Dr. Ree Alvarez D.O.        Laith Valeria  69 y.o.  male      Re: Per family request  Requesting physician: Dr. Bang Hampton  Date:12:59 PM 1/27/2021          HPI: 43-year-old male patient in the hospital with main complaints of abdominal pain with associated constipation. Patient has been seen in our office on Monday and was treated for worsening chronic constipation. Patient reports mid and upper abdominal pain without radiation. He denies any significant nausea vomiting at this time. Patient reports passing gas but no bowel movement since. Patient reports some nausea and dry heaves prior. Patient denies fever or chills. CT scan of the abdomen and pelvis revealed possible pancreatic inflammation. Patient had MRI also showing some changes in the pancreatic head but no evidence of biliary obstruction. Patient reports previous episode of pancreatitis which he reports was attributed to blood pressure medication however he is unsure of. Patient reports previous significant alcohol abuse however no recent alcohol abuse with possibly 2 glasses of wine 1 to 2 weeks ago.   Patient reports previous endoscopies in our office and above-described history of chronic constipation    Information sources:   -Patient  -medical record  -health care team    PMHx:  Past Medical History:   Diagnosis Date    Acid reflux     Arthritis     Cancer ([address]     skin on top of head    History of cardiovascular stress test 11/2015    lexiscan    Hypertension     Sleep apnea     CPAP @ Night       PSHx:  Past Surgical History:   Procedure Laterality Date    CATARACT REMOVAL WITH IMPLANT  04 25 2012    right    CATARACT REMOVAL WITH IMPLANT  09-11-12    left eye    COLONOSCOPY      ENDOSCOPY, COLON, DIAGNOSTIC      HERNIA REPAIR      alfonso    JOINT REPLACEMENT      left hip    KNEE ARTHROSCOPY      SKIN BIOPSY         Meds:  Current Facility-Administered Medications   Medication Dose Route Frequency Provider Last Rate Last Admin    polyethylene glycol (GLYCOLAX) packet 17 g  17 g Oral BID Ken Ambrocio MD        docusate sodium (COLACE) capsule 100 mg  100 mg Oral BID Ken Ambrocio MD        albuterol (PROVENTIL) nebulizer solution 2.5 mg  2.5 mg Nebulization Q6H PRN Madalyn Elizabeth MD        tamsulosin (FLOMAX) capsule 0.4 mg  0.4 mg Oral Daily Eric Mayorga MD   0.4 mg at 01/27/21 0946    amLODIPine (NORVASC) tablet 5 mg  5 mg Oral Daily Eric Mayorga MD   5 mg at 01/27/21 0946    carBAMazepine (TEGRETOL) chewable tablet 100 mg  100 mg Oral Nightly Eric Mayorga MD   100 mg at 01/26/21 2138    finasteride (PROSCAR) tablet 5 mg  5 mg Oral Nightly Eric Mayorga MD   5 mg at 01/26/21 2139    fluticasone (FLONASE) 50 MCG/ACT nasal spray 2 spray  2 spray Nasal Nightly Eric Mayorga MD   2 spray at 01/26/21 2139    losartan (COZAAR) tablet 100 mg  100 mg Oral BID Eric Mayorga MD   100 mg at 01/27/21 0946    ondansetron (ZOFRAN) tablet 4 mg  4 mg Oral Q8H PRN Eric Mayorga MD   4 mg at 01/26/21 1103    pantoprazole (PROTONIX) tablet 40 mg  40 mg Oral Daily Eric Mayorga MD   40 mg at 01/27/21 0946    0.9 % sodium chloride infusion   Intravenous Continuous Peace Peace MD 75 mL/hr at 01/27/21 0946 Rate Change at 01/27/21 0946    sodium chloride flush 0.9 % injection 10 mL  10 mL Intravenous 2 times per day Madalyn Elizabeth MD   10 mL at 01/26/21 2139    sodium chloride flush 0.9 % injection 10 mL  10 mL Intravenous PRN Eric Mayorga MD        enoxaparin (LOVENOX) injection 40 mg  40 mg Subcutaneous Daily Eric Mayorga MD   40 mg at 01/27/21 0946    acetaminophen (TYLENOL) tablet 650 mg  650 mg Oral Q6H PRN Madalyn Elizabeth MD   650 mg at 01/26/21 1636    Or    acetaminophen (TYLENOL) suppository 650 mg  650 mg Rectal Q6H PRN Clovis Valenzuela MD        morphine injection 4 mg  4 mg Intravenous Q4H PRN Maritza Keen MD           SocHx:  Social History     Socioeconomic History    Marital status:      Spouse name: Not on file    Number of children: Not on file    Years of education: Not on file    Highest education level: Not on file   Occupational History    Not on file   Social Needs    Financial resource strain: Not on file    Food insecurity     Worry: Not on file     Inability: Not on file    Transportation needs     Medical: Not on file     Non-medical: Not on file   Tobacco Use    Smoking status: Former Smoker     Packs/day: 1.00     Years: 25.00     Pack years: 25.00    Smokeless tobacco: Former User     Quit date: 9/7/1982   Substance and Sexual Activity    Alcohol use: Yes     Comment: social    Drug use: No    Sexual activity: Not on file   Lifestyle    Physical activity     Days per week: Not on file     Minutes per session: Not on file    Stress: Not on file   Relationships    Social connections     Talks on phone: Not on file     Gets together: Not on file     Attends Jew service: Not on file     Active member of club or organization: Not on file     Attends meetings of clubs or organizations: Not on file     Relationship status: Not on file    Intimate partner violence     Fear of current or ex partner: Not on file     Emotionally abused: Not on file     Physically abused: Not on file     Forced sexual activity: Not on file   Other Topics Concern    Not on file   Social History Narrative    Not on file       FamHx:  Family History   Problem Relation Age of Onset    Cancer Mother     Diabetes Father     Heart Disease Paternal Grandfather        Allergy:  Allergies   Allergen Reactions    Latex Rash    Vicodin [Hydrocodone-Acetaminophen] Other (See Comments)     Causes hypertension         ROS: As described in the HPI and in addition is negative upon detailed review of systems or unobtainable unless otherwise stated in this dictation. PE:  /72   Pulse 80   Temp 98.1 °F (36.7 °C) (Oral)   Resp 20   Ht 5' 10\" (1.778 m)   Wt 229 lb 4.8 oz (104 kg)   SpO2 97%   BMI 32.90 kg/m²     Gen.: NAD/ male. Obese  Head: Atraumatic/normocephalic  Eyes: EOMI/anicteric sclera  ENT: Moist oral mucosa/no discharge from nose or ears  Neck: Supple with trachea midline  Chest: CTA B  Cor: RRR  Abd.: Soft and obese. Minimally tender in the mid abdomen  Extr.:  Trace peripheral edema  Muscles: Tone and bulk, consistent with age and condition  Skin: Warm and dry/anicteric      DATA:     Lab Results   Component Value Date    WBC 17.6 01/25/2021    RBC 4.57 01/25/2021    HGB 14.6 01/25/2021    HCT 42.4 01/25/2021    MCV 92.8 01/25/2021    MCH 31.9 01/25/2021    MCHC 34.4 01/25/2021    RDW 13.1 01/25/2021     01/25/2021    MPV 10.0 01/25/2021     Lab Results   Component Value Date     01/27/2021    K 3.2 01/27/2021    K 3.8 01/25/2021     01/27/2021    CO2 27 01/27/2021    BUN 10 01/27/2021    CREATININE 0.8 01/27/2021    CALCIUM 9.0 01/27/2021    PROT 6.8 01/27/2021    LABALBU 3.7 01/27/2021    LABALBU 4.8 05/23/2012    BILITOT 0.5 01/27/2021    ALKPHOS 78 01/27/2021    AST 11 01/27/2021    ALT 13 01/27/2021     Lab Results   Component Value Date    LIPASE 33 01/27/2021     Lab Results   Component Value Date    AMYLASE 31 11/10/2010         ASSESSMENT/PLAN:  1. Pancreatitis  -Acute/resolving  -Resolved lactic acidosis  -Preserved renal function  -Previous episodes of pancreatitis more than 2 decades ago  -No evidence of biliary obstruction  -Consider further evaluation with endoscopic ultrasound as outpatient  -Attempt to advance diet  -Increased stool softeners and laxatives    2. Chronic constipation  -As above    Above discussed with the patient and all questions answered to his satisfaction.   He is agreeable with the plan as

## 2021-01-28 LAB
ANION GAP SERPL CALCULATED.3IONS-SCNC: 9 MMOL/L (ref 7–16)
BASOPHILS ABSOLUTE: 0.03 E9/L (ref 0–0.2)
BASOPHILS RELATIVE PERCENT: 0.4 % (ref 0–2)
BUN BLDV-MCNC: 9 MG/DL (ref 8–23)
CALCIUM SERPL-MCNC: 9 MG/DL (ref 8.6–10.2)
CHLORIDE BLD-SCNC: 107 MMOL/L (ref 98–107)
CO2: 28 MMOL/L (ref 22–29)
CREAT SERPL-MCNC: 0.9 MG/DL (ref 0.7–1.2)
EOSINOPHILS ABSOLUTE: 0.3 E9/L (ref 0.05–0.5)
EOSINOPHILS RELATIVE PERCENT: 3.8 % (ref 0–6)
GFR AFRICAN AMERICAN: >60
GFR NON-AFRICAN AMERICAN: >60 ML/MIN/1.73
GLUCOSE BLD-MCNC: 102 MG/DL (ref 74–99)
HCT VFR BLD CALC: 35.9 % (ref 37–54)
HEMOGLOBIN: 12 G/DL (ref 12.5–16.5)
IMMATURE GRANULOCYTES #: 0.07 E9/L
IMMATURE GRANULOCYTES %: 0.9 % (ref 0–5)
LYMPHOCYTES ABSOLUTE: 0.99 E9/L (ref 1.5–4)
LYMPHOCYTES RELATIVE PERCENT: 12.5 % (ref 20–42)
MCH RBC QN AUTO: 31.7 PG (ref 26–35)
MCHC RBC AUTO-ENTMCNC: 33.4 % (ref 32–34.5)
MCV RBC AUTO: 95 FL (ref 80–99.9)
MONOCYTES ABSOLUTE: 0.88 E9/L (ref 0.1–0.95)
MONOCYTES RELATIVE PERCENT: 11.1 % (ref 2–12)
NEUTROPHILS ABSOLUTE: 5.68 E9/L (ref 1.8–7.3)
NEUTROPHILS RELATIVE PERCENT: 71.3 % (ref 43–80)
PDW BLD-RTO: 13.2 FL (ref 11.5–15)
PLATELET # BLD: 193 E9/L (ref 130–450)
PMV BLD AUTO: 10.3 FL (ref 7–12)
POTASSIUM SERPL-SCNC: 4 MMOL/L (ref 3.5–5)
RBC # BLD: 3.78 E12/L (ref 3.8–5.8)
SARS-COV-2, NAAT: NOT DETECTED
SODIUM BLD-SCNC: 144 MMOL/L (ref 132–146)
WBC # BLD: 8 E9/L (ref 4.5–11.5)

## 2021-01-28 PROCEDURE — 1200000000 HC SEMI PRIVATE

## 2021-01-28 PROCEDURE — 96372 THER/PROPH/DIAG INJ SC/IM: CPT

## 2021-01-28 PROCEDURE — 85025 COMPLETE CBC W/AUTO DIFF WBC: CPT

## 2021-01-28 PROCEDURE — 6370000000 HC RX 637 (ALT 250 FOR IP): Performed by: HOSPITALIST

## 2021-01-28 PROCEDURE — 6370000000 HC RX 637 (ALT 250 FOR IP): Performed by: INTERNAL MEDICINE

## 2021-01-28 PROCEDURE — U0002 COVID-19 LAB TEST NON-CDC: HCPCS

## 2021-01-28 PROCEDURE — 6360000002 HC RX W HCPCS: Performed by: INTERNAL MEDICINE

## 2021-01-28 PROCEDURE — 96361 HYDRATE IV INFUSION ADD-ON: CPT

## 2021-01-28 PROCEDURE — 80048 BASIC METABOLIC PNL TOTAL CA: CPT

## 2021-01-28 PROCEDURE — 36415 COLL VENOUS BLD VENIPUNCTURE: CPT

## 2021-01-28 PROCEDURE — 2580000003 HC RX 258: Performed by: INTERNAL MEDICINE

## 2021-01-28 PROCEDURE — 94660 CPAP INITIATION&MGMT: CPT

## 2021-01-28 PROCEDURE — 82787 IGG 1 2 3 OR 4 EACH: CPT

## 2021-01-28 RX ORDER — CARVEDILOL 3.12 MG/1
3.12 TABLET ORAL 2 TIMES DAILY WITH MEALS
COMMUNITY

## 2021-01-28 RX ORDER — POLYETHYLENE GLYCOL 3350 17 G/17G
17 POWDER, FOR SOLUTION ORAL DAILY PRN
Status: DISCONTINUED | OUTPATIENT
Start: 2021-01-28 | End: 2021-01-29 | Stop reason: HOSPADM

## 2021-01-28 RX ORDER — CHOLECALCIFEROL (VITAMIN D3) 1250 MCG
CAPSULE ORAL WEEKLY
COMMUNITY

## 2021-01-28 RX ORDER — DIPHENHYDRAMINE HCL 25 MG
25 TABLET ORAL EVERY 6 HOURS PRN
Status: DISCONTINUED | OUTPATIENT
Start: 2021-01-28 | End: 2021-01-29 | Stop reason: HOSPADM

## 2021-01-28 RX ORDER — LOSARTAN POTASSIUM 50 MG/1
100 TABLET ORAL DAILY
Status: DISCONTINUED | OUTPATIENT
Start: 2021-01-28 | End: 2021-01-29 | Stop reason: HOSPADM

## 2021-01-28 RX ORDER — DICYCLOMINE HYDROCHLORIDE 10 MG/1
10 CAPSULE ORAL PRN
COMMUNITY

## 2021-01-28 RX ORDER — AMLODIPINE BESYLATE 10 MG/1
10 TABLET ORAL DAILY
Status: DISCONTINUED | OUTPATIENT
Start: 2021-01-28 | End: 2021-01-29 | Stop reason: HOSPADM

## 2021-01-28 RX ORDER — SENNA PLUS 8.6 MG/1
1 TABLET ORAL DAILY
COMMUNITY

## 2021-01-28 RX ORDER — SENNA PLUS 8.6 MG/1
1 TABLET ORAL DAILY PRN
Status: DISCONTINUED | OUTPATIENT
Start: 2021-01-28 | End: 2021-01-29 | Stop reason: HOSPADM

## 2021-01-28 RX ORDER — LANOLIN/MINERAL OIL
LOTION (ML) TOPICAL 2 TIMES DAILY PRN
Status: DISCONTINUED | OUTPATIENT
Start: 2021-01-28 | End: 2021-01-29 | Stop reason: HOSPADM

## 2021-01-28 RX ORDER — LANOLIN ALCOHOL/MO/W.PET/CERES
3 CREAM (GRAM) TOPICAL NIGHTLY
COMMUNITY

## 2021-01-28 RX ORDER — LANOLIN ALCOHOL/MO/W.PET/CERES
3 CREAM (GRAM) TOPICAL NIGHTLY
Status: DISCONTINUED | OUTPATIENT
Start: 2021-01-28 | End: 2021-01-29 | Stop reason: HOSPADM

## 2021-01-28 RX ORDER — LUBIPROSTONE 24 UG/1
24 CAPSULE, GELATIN COATED ORAL
COMMUNITY

## 2021-01-28 RX ORDER — SENNA PLUS 8.6 MG/1
1 TABLET ORAL DAILY
Status: DISCONTINUED | OUTPATIENT
Start: 2021-01-28 | End: 2021-01-28

## 2021-01-28 RX ADMIN — DIPHENHYDRAMINE HCL 25 MG: 25 TABLET ORAL at 09:05

## 2021-01-28 RX ADMIN — Medication 3 MG: at 20:52

## 2021-01-28 RX ADMIN — PANTOPRAZOLE SODIUM 40 MG: 40 TABLET, DELAYED RELEASE ORAL at 09:05

## 2021-01-28 RX ADMIN — LOSARTAN POTASSIUM 100 MG: 50 TABLET, FILM COATED ORAL at 09:05

## 2021-01-28 RX ADMIN — SODIUM CHLORIDE: 9 INJECTION, SOLUTION INTRAVENOUS at 11:21

## 2021-01-28 RX ADMIN — TAMSULOSIN HYDROCHLORIDE 0.4 MG: 0.4 CAPSULE ORAL at 09:05

## 2021-01-28 RX ADMIN — AMLODIPINE BESYLATE 10 MG: 10 TABLET ORAL at 09:05

## 2021-01-28 RX ADMIN — DIPHENHYDRAMINE HCL 25 MG: 25 TABLET ORAL at 21:01

## 2021-01-28 RX ADMIN — FINASTERIDE 5 MG: 5 TABLET, FILM COATED ORAL at 20:52

## 2021-01-28 RX ADMIN — ENOXAPARIN SODIUM 40 MG: 40 INJECTION SUBCUTANEOUS at 09:04

## 2021-01-28 RX ADMIN — STANDARDIZED SENNA CONCENTRATE 8.6 MG: 8.6 TABLET ORAL at 09:05

## 2021-01-28 RX ADMIN — DOCUSATE SODIUM 100 MG: 100 CAPSULE, LIQUID FILLED ORAL at 09:05

## 2021-01-28 RX ADMIN — POLYETHYLENE GLYCOL 3350 17 G: 17 POWDER, FOR SOLUTION ORAL at 09:05

## 2021-01-28 RX ADMIN — Medication: at 11:20

## 2021-01-28 NOTE — PROGRESS NOTES
cooperative, no apparent distress, and appears stated age  EYES:  Lids and lashes normal, pupils equal, round and reactive to light, extra ocular muscles intact, sclera clear, conjunctiva normal  ENT:  Normocephalic, without obvious abnormality, atraumatic, sinuses nontender on palpation, external ears without lesions, oral pharynx with moist mucus membranes, tonsils without erythema or exudates, gums normal and good dentition. NECK:  Supple, symmetrical, trachea midline, no adenopathy, thyroid symmetric, not enlarged and no tenderness, skin normal  HEMATOLOGIC/LYMPHATICS:  no cervical lymphadenopathy  BACK:  Symmetric, no curvature, spinous processes are non-tender on palpation, paraspinous muscles are non-tender on palpation, no costal vertebral tenderness  LUNGS:  No increased work of breathing, good air exchange, clear to auscultation bilaterally, no crackles or wheezing  CARDIOVASCULAR:  normal apical pulses  ABDOMEN:  No scars, normal bowel sounds, soft, non-distended, non-tender, no masses palpated, no hepatosplenomegally  MUSCULOSKELETAL:  there is no redness, warmth, or swelling of the joints  NEUROLOGIC:  Awake, alert, oriented to name, place and time. SKIN:  no bruising or bleeding  Data    CBC:   Lab Results   Component Value Date    WBC 8.0 01/28/2021    RBC 3.78 01/28/2021    HGB 12.0 01/28/2021    HCT 35.9 01/28/2021    MCV 95.0 01/28/2021    MCH 31.7 01/28/2021    MCHC 33.4 01/28/2021    RDW 13.2 01/28/2021     01/28/2021    MPV 10.3 01/28/2021     BMP:    Lab Results   Component Value Date     01/28/2021    K 4.0 01/28/2021    K 3.8 01/25/2021     01/28/2021    CO2 28 01/28/2021    BUN 9 01/28/2021    LABALBU 3.7 01/27/2021    LABALBU 4.8 05/23/2012    CREATININE 0.9 01/28/2021    CALCIUM 9.0 01/28/2021    GFRAA >60 01/28/2021    LABGLOM >60 01/28/2021    GLUCOSE 102 01/28/2021    GLUCOSE 95 05/23/2012       ASSESSMENT AND PLAN        1.   Acute pancreatitis  Much improved. Continue current management with IV fluid and pain medication. Advancing diet to cardiac diet. Appreciate GI input    2. Hypertension essential:  Good control on current medication. On losartan and amlodipine. Dose of both medication has been adjusted to match home dosing. 3.  Hypokalemia:  Resolved. 4.  Constipation: Resume home stool softener. 5.  JAMES: CPAP at night. 6.  Lactic acidosis: Resolved    Added benadryl and Eucerine lotion for dry and itching palm.

## 2021-01-28 NOTE — CARE COORDINATION
1/28/2021 1336 CM note: NEGATIVE COVID TEST 1/28/21. Plan is home, no needs, neighbor will transport home.  Isabel BYRD

## 2021-01-28 NOTE — PROGRESS NOTES
Spoke with Dr. David Vizcarra via telephone to notify that patient's home med list was updated. Some changes to doses, frequency of taking medications. Patient takes 100 mg Losartan daily, not twice a day, and Norvasc 10 mg daily. Patient also requesting something for his hands that are \"itching. \" Information is in med rec for updated medications.     Electronically signed by Nas Wilkins RN on 1/28/2021 at 7:09 AM

## 2021-01-28 NOTE — PROGRESS NOTES
PROGRESS NOTE    By Hernán Jordan D.O., GI Fellow    [facility] and Ирина Villatoro M.D., Dr. Filomena Sever, M.D., Dr. Miriam Forte., Dr. Afua Mercado M.D., Dr. Kristi Agustin  69 y.o.  male    SUBJECTIVE:    No acute events overnight. Patient endorses mild discomfort when attempting diet yesterday. States it might have been secondary to the greasy mashed potatoes and spicy soup. Tolerated dinner without any issues and is anticipating advancement to general diet. Discussed suspicion of medication induced pancreatitis as work-up so far has been unremarkable.   Discussed reevaluating medications with prescriber    OBJECTIVE:    BP (!) 156/77   Pulse 65   Temp 98.1 °F (36.7 °C) (Oral)   Resp 16   Ht 5' 10\" (1.778 m)   Wt 229 lb 4.8 oz (104 kg)   SpO2 97%   BMI 32.90 kg/m²   GEN: A&Ox3, friendly, NAD  HEENT:PEERL, no icterus  Heart: RRR  Lungs: CTAB  Abd.: soft, NT, ND, BS +, no G/R, no HSM  Extr.: no C/C/E, no brusing      Stool (measured) : 0 mL  Lab Results   Component Value Date    WBC 8.0 01/28/2021    WBC 17.6 01/25/2021    WBC 4.5 07/19/2020    HGB 12.0 01/28/2021    HGB 14.6 01/25/2021    HGB 12.9 07/19/2020    HCT 35.9 01/28/2021    MCV 95.0 01/28/2021    RDW 13.2 01/28/2021     01/28/2021     01/25/2021     07/19/2020     Lab Results   Component Value Date     01/28/2021    K 4.0 01/28/2021    K 3.8 01/25/2021     01/28/2021    CO2 28 01/28/2021    BUN 9 01/28/2021    CREATININE 0.9 01/28/2021    CALCIUM 9.0 01/28/2021    PROT 6.8 01/27/2021    LABALBU 3.7 01/27/2021    LABALBU 4.8 05/23/2012    BILITOT 0.5 01/27/2021    BILITOT 0.7 01/26/2021    BILITOT 0.8 01/25/2021    ALKPHOS 78 01/27/2021    ALKPHOS 88 01/26/2021    ALKPHOS 97 01/25/2021    AST 11 01/27/2021    AST 15 01/26/2021    AST 16 01/25/2021    ALT 13 01/27/2021    ALT 17 01/26/2021    ALT 18 01/25/2021     Lab Results   Component Value Date    LIPASE 33 01/27/2021    LIPASE 57 01/26/2021    LIPASE 84 01/25/2021     Lab Results   Component Value Date    AMYLASE 31 11/10/2010         ASSESSMENT/PLAN:  1. Acute pancreatitis  -Acute/resolving  -Resolved lactic acidosis  -Preserved renal function  -Previous episodes of pancreatitis more than 2 decades ago  -No evidence of biliary obstruction  -MRCP unremarkable  -Could be drug-induced as patient is on 3 medications that are associated with drug-induced pancreatitis; hydrochlorothiazide, carbamazepine, and less likely pantoprazole  -Consider further evaluation with endoscopic ultrasound as outpatient and reevaluation of the above-stated medications with prescriber  -Lion for general diet from GI POV       2. Chronic constipation  -Increased stool softeners and laxatives      We will discuss with attending       Alycia Quesada DO  1/28/2021  10:37 AM    Pt seen and independently examined. Pertinent notes and lab work reviewed. Monitor reviewed showing sinus rhythm  D/w Dr. Ericka Lopez. Agree with physical exam and A&P. Okay to be discharged from GI POV. Follow-up in the office in 2 to 3 weeks after discharge. Patient to call to confirm appointment and with questions/concerns at [phone]. Discussed with patient - all questions answered - agreeable with the plan as delineated. Thank you for the opportunity to participate in the care of Mr. Gerri Brunner.     Rogelio Thao MD  1/28/2021  2:45 PM

## 2021-01-29 VITALS
WEIGHT: 229.3 LBS | HEIGHT: 70 IN | TEMPERATURE: 97.6 F | OXYGEN SATURATION: 97 % | HEART RATE: 65 BPM | DIASTOLIC BLOOD PRESSURE: 79 MMHG | BODY MASS INDEX: 32.83 KG/M2 | SYSTOLIC BLOOD PRESSURE: 144 MMHG | RESPIRATION RATE: 18 BRPM

## 2021-01-29 PROCEDURE — 6360000002 HC RX W HCPCS: Performed by: INTERNAL MEDICINE

## 2021-01-29 PROCEDURE — 6370000000 HC RX 637 (ALT 250 FOR IP): Performed by: INTERNAL MEDICINE

## 2021-01-29 PROCEDURE — 96372 THER/PROPH/DIAG INJ SC/IM: CPT

## 2021-01-29 PROCEDURE — 94660 CPAP INITIATION&MGMT: CPT

## 2021-01-29 RX ADMIN — AMLODIPINE BESYLATE 10 MG: 10 TABLET ORAL at 09:36

## 2021-01-29 RX ADMIN — PANTOPRAZOLE SODIUM 40 MG: 40 TABLET, DELAYED RELEASE ORAL at 09:36

## 2021-01-29 RX ADMIN — TAMSULOSIN HYDROCHLORIDE 0.4 MG: 0.4 CAPSULE ORAL at 09:36

## 2021-01-29 RX ADMIN — DIPHENHYDRAMINE HCL 25 MG: 25 TABLET ORAL at 09:37

## 2021-01-29 RX ADMIN — DIPHENHYDRAMINE HCL 25 MG: 25 TABLET ORAL at 03:10

## 2021-01-29 RX ADMIN — LOSARTAN POTASSIUM 100 MG: 50 TABLET, FILM COATED ORAL at 09:36

## 2021-01-29 RX ADMIN — ENOXAPARIN SODIUM 40 MG: 40 INJECTION SUBCUTANEOUS at 09:37

## 2021-01-29 ASSESSMENT — PAIN SCALES - GENERAL: PAINLEVEL_OUTOF10: 0

## 2021-01-29 NOTE — DISCHARGE SUMMARY
Physician Discharge Summary     Patient ID:  Odin Peoples  [id number]  69 y.o.  1950    Admit date: 1/25/2021    Discharge date and time: No discharge date for patient encounter. Admitting Physician: Alexis Burr MD     Discharge Physician: Amara Aldrichjames    Admission Diagnoses: Acute pancreatitis, unspecified complication status, unspecified pancreatitis type [K85.90]  Idiopathic acute pancreatitis [K85.00]    Discharge Diagnoses:   1. Acute Pancreatitis (idiopathic vs Medication induced)  2. Hypertension essential  3. Constipation  4. JAMES. 5.  Hypokalemia: Resolved  6. Lactic acidosis: Resolved    Admission Condition: fair    Discharged Condition: good    Indication for Admission: abdominal pain    Hospital Course:   Mr. Katherine Quinn was admitted with abdominal pain. CT scan suggestive of pancreatitis. There was concern for cholelithiasis. But evaluation with MRI/MRCP was negative for any stone. Patient was treated with IV fluid and pain medication. He is abdominal pain improved gradually. He was evaluated by GI who recommended outpatient follow-up with them. There was concern that hydrochlorothiazide and carbamazepine might be contributing to his pancreatitis. However he has been on these 2 medication for very long time. He was encouraged to discontinue this medication and follow-up with his primary care doctor for possible replacement of carbamazepine. Blood pressure seems to be fairly well controlled while he is in the hospital.  He is discharged in stable condition. He tolerated diet well.     Consults: GI    Significant Diagnostic Studies: labs:     Treatments: IV hydration    Discharge Exam:  BP (!) 156/83   Pulse 65   Temp 98.5 °F (36.9 °C) (Oral)   Resp 13   Ht 5' 10\" (1.778 m)   Wt 229 lb 4.8 oz (104 kg)   SpO2 98%   BMI 32.90 kg/m²   General appearance: alert, appears stated age and cooperative  Head: Normocephalic, without obvious abnormality, atraumatic  Eyes: conjunctivae/corneas clear. PERRL, EOM's intact. Fundi benign. Ears: normal TM's and external ear canals both ears  Nose: Nares normal. Septum midline. Mucosa normal. No drainage or sinus tenderness. Throat: lips, mucosa, and tongue normal; teeth and gums normal  Neck: no adenopathy, no carotid bruit, no JVD, supple, symmetrical, trachea midline and thyroid not enlarged, symmetric, no tenderness/mass/nodules  Back: symmetric, no curvature. ROM normal. No CVA tenderness. Lungs: clear to auscultation bilaterally  Chest wall: no tenderness  Heart: regular rate and rhythm, S1, S2 normal, no murmur, click, rub or gallop  Abdomen: soft, non-tender; bowel sounds normal; no masses,  no organomegaly  Extremities: extremities normal, atraumatic, no cyanosis or edema  Pulses: 2+ and symmetric  Skin: Skin color, texture, turgor normal. No rashes or lesions    Disposition: home    In process/preliminary results:  Outstanding Order Results     No orders found from 12/27/2020 to 1/26/2021.           Patient Instructions:   Current Discharge Medication List      CONTINUE these medications which have NOT CHANGED    Details   dicyclomine (BENTYL) 10 MG capsule Take 10 mg by mouth as needed Indications: Pain in the Abdominal Region      senna (SENOKOT) 8.6 MG tablet Take 1 tablet by mouth daily      lubiprostone (AMITIZA) 24 MCG capsule Take 24 mcg by mouth      melatonin 3 MG TABS tablet Take 3 mg by mouth nightly      carvedilol (COREG) 3.125 MG tablet Take 3.125 mg by mouth 2 times daily (with meals)      Cholecalciferol (VITAMIN D3) 1.25 MG (93501 UT) CAPS Take by mouth once a week Indications: per pt. thinks last took on last Friday/Saturday?      meloxicam (MOBIC) 15 MG tablet Take 15 mg by mouth daily      amLODIPine (NORVASC) 5 MG tablet Take 10 mg by mouth daily       fluticasone (FLONASE) 50 MCG/ACT nasal spray 2 sprays by Nasal route nightly      polyvinyl alcohol (LUBRICANT DROPS) 1.4 % ophthalmic solution Place 1 drop into both eyes every hour as needed for Dry Eyes      finasteride (PROSCAR) 5 MG tablet Take 5 mg by mouth nightly       losartan (COZAAR) 100 MG tablet Take 100 mg by mouth daily       alfuzosin (UROXATRAL) 10 MG SR tablet Take 10 mg by mouth nightly       ondansetron (ZOFRAN) 4 MG tablet Take 1 tablet by mouth every 8 hours as needed for Nausea or Vomiting  Qty: 20 tablet, Refills: 0      albuterol sulfate  (90 Base) MCG/ACT inhaler Inhale 2 puffs into the lungs every 4 hours as needed for Wheezing  Qty: 1 Inhaler, Refills: 3      polyethylene glycol (GLYCOLAX) powder Take 17 g by mouth Daily with lunch      sodium chloride (OCEAN, BABY AYR) 0.65 % nasal spray 2 sprays by Nasal route as needed for Congestion      pantoprazole (PROTONIX) 40 MG tablet Take 40 mg by mouth daily         STOP taking these medications       ibuprofen (ADVIL;MOTRIN) 800 MG tablet Comments:   Reason for Stopping:         guaiFENesin (ALTARUSSIN) 100 MG/5ML syrup Comments:   Reason for Stopping:         carBAMazepine (TEGRETOL) 100 MG chewable tablet Comments:   Reason for Stopping:         hydrochlorothiazide (HYDRODIURIL) 25 MG tablet Comments:   Reason for Stopping:         influenza trivalent-split vaccine, PF, (FLUARIX/FLUZONE/AFLURIA) injection Comments:   Reason for Stopping:             Activity: activity as tolerated  Diet: cardiac diet  Wound Care: none needed    Follow-up with PCP in 2 weeks.     SignedLeila Daviduse Ofungwu  1/29/2021  9:28 AM

## 2021-01-29 NOTE — DISCHARGE INSTR - COC
Continuity of Care Form    Patient Name: Genna Barrios   :  1950  MRN:  [id number]    Admit date:  2021  Discharge date:  ***    Code Status Order: Full Code   Advance Directives:   Advance Care Flowsheet Documentation       Date/Time Healthcare Directive Type of Healthcare Directive Copy in [address] Agent's Name Healthcare Agent's Phone Number    21 [phone]  No, patient does not have an advance directive for healthcare treatment -- -- -- -- --            Admitting Physician:  Nazario Love MD  PCP: [place], [place]    Discharging Nurse: [facility] Unit/Room#: 7193/9939-49  Discharging Unit Phone Number: ***    Emergency Contact:   Extended Emergency Contact Information  Primary Emergency Contact: Lowell Goldstein   [address] Phone: [phone]  Relation: Child    Past Surgical History:  Past Surgical History:   Procedure Laterality Date    CATARACT REMOVAL WITH IMPLANT  2012    right    CATARACT REMOVAL WITH IMPLANT  12    left eye    COLONOSCOPY      ENDOSCOPY, COLON, DIAGNOSTIC      HERNIA REPAIR      navel    JOINT REPLACEMENT      left hip    KNEE ARTHROSCOPY      SKIN BIOPSY         Immunization History: There is no immunization history on file for this patient.     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    Ptosis H02.409    Bronchitis J40    Essential hypertension I10    Gastroesophageal reflux disease without esophagitis K21.9    Acute pancreatitis K85.90    Idiopathic acute pancreatitis K85.00       Isolation/Infection:   Isolation            No Isolation          Patient Infection Status       Infection Onset Added Last Indicated Last Indicated By Review Planned Expiration Resolved Resolved By    None active    Resolved    COVID-19 Rule Out 21 COVID-19 (Ordered) 01/28/21 Rule-Out Test Resulted    COVID-19 Rule Out 07/19/20 07/19/20 07/19/20 COVID-19 (Ordered)   07/19/20 Rule-Out Test Resulted            Nurse Assessment:  Last Vital Signs: BP (!) 156/83   Pulse 65   Temp 98.5 °F (36.9 °C) (Oral)   Resp 13   Ht 5' 10\" (1.778 m)   Wt 229 lb 4.8 oz (104 kg)   SpO2 98%   BMI 32.90 kg/m²     Last documented pain score (0-10 scale): Pain Level: 0  Last Weight:   Wt Readings from Last 1 Encounters:   01/26/21 229 lb 4.8 oz (104 kg)     Mental Status:  {IP PT MENTAL STATUS:20030:::0}    IV Access:  { AYESHA IV ACCESS:713306233:::0}    Nursing Mobility/ADLs:  Walking   {CHP DME ADLs:122035551:::0}  Transfer  {CHP DME ADLs:585881638:::0}  Bathing  {CHP DME ADLs:944980134:::0}  Dressing  {CHP DME ADLs:701169621:::0}  Toileting  {CHP DME ADLs:836750037:::0}  Feeding  {CHP DME ADLs:551981076:::0}  Med Admin  {CHP DME ADLs:235267426:::0}  Med Delivery   { AYESHA MED Delivery:215060271:::0}    Wound Care Documentation and Therapy:        Elimination:  Continence: Bowel: {YES / OM:68606}  Bladder: {YES / LL:59443}  Urinary Catheter: {Urinary Catheter:004013077:::0}   Colostomy/Ileostomy/Ileal Conduit: {YES / OC:92698}       Date of Last BM: ***    Intake/Output Summary (Last 24 hours) at 1/29/2021 4316  Last data filed at 1/28/2021 2230  Gross per 24 hour   Intake 595 ml   Output 200 ml   Net 395 ml     I/O last 3 completed shifts:   In: 12 [P.O.:595]  Out: 200 [Urine:200]    Safety Concerns:     [employer] Safety Concerns:689417436:::0}    Impairments/Disabilities:      [employer] Impairments/Disabilities:765140688:::0}    Nutrition Therapy:  Current Nutrition Therapy:   [address] AYESHA Diet List:168587717:::0}    Routes of Feeding: {CHP DME Other Feedings:904236876:::0}  Liquids: {Slp liquid thickness:46472}  Daily Fluid Restriction: {CHP DME Yes amt example:212718758:::0}  Last Modified Barium Swallow with Video (Video Swallowing Test): {Done Not Done XKTD:746403335:::7}    Treatments at the

## 2021-01-31 LAB
IGG 1: 428 MG/DL (ref 240–1118)
IGG 2: 130 MG/DL (ref 124–549)
IGG 3: 17 MG/DL (ref 21–134)
IGG 4: 6 MG/DL (ref 1–123)

## 2021-12-26 ENCOUNTER — HOSPITAL ENCOUNTER (EMERGENCY)
Age: 71
Discharge: HOME | End: 2021-12-26
Payer: MEDICARE

## 2021-12-26 VITALS
SYSTOLIC BLOOD PRESSURE: 151 MMHG | HEART RATE: 85 BPM | WEIGHT: 240 LBS | OXYGEN SATURATION: 98 % | TEMPERATURE: 97.3 F | BODY MASS INDEX: 34.44 KG/M2 | RESPIRATION RATE: 16 BRPM | DIASTOLIC BLOOD PRESSURE: 89 MMHG

## 2021-12-26 DIAGNOSIS — Z20.822 ENCOUNTER FOR LABORATORY TESTING FOR COVID-19 VIRUS: Primary | ICD-10-CM

## 2021-12-26 DIAGNOSIS — M54.42 ACUTE LEFT-SIDED LOW BACK PAIN WITH LEFT-SIDED SCIATICA: ICD-10-CM

## 2021-12-26 DIAGNOSIS — J06.9 VIRAL URI WITH COUGH: ICD-10-CM

## 2021-12-26 PROCEDURE — 99211 OFF/OP EST MAY X REQ PHY/QHP: CPT

## 2021-12-26 PROCEDURE — U0003 INFECTIOUS AGENT DETECTION BY NUCLEIC ACID (DNA OR RNA); SEVERE ACUTE RESPIRATORY SYNDROME CORONAVIRUS 2 (SARS-COV-2) (CORONAVIRUS DISEASE [COVID-19]), AMPLIFIED PROBE TECHNIQUE, MAKING USE OF HIGH THROUGHPUT TECHNOLOGIES AS DESCRIBED BY CMS-2020-01-R: HCPCS

## 2021-12-26 PROCEDURE — U0005 INFEC AGEN DETEC AMPLI PROBE: HCPCS

## 2021-12-26 RX ORDER — GUAIFENESIN, PSEUDOEPHEDRINE HYDROCHLORIDE 600; 60 MG/1; MG/1
1 TABLET, EXTENDED RELEASE ORAL EVERY 12 HOURS
Qty: 10 TABLET | Refills: 0 | Status: SHIPPED | OUTPATIENT
Start: 2021-12-26 | End: 2021-12-31

## 2021-12-26 RX ORDER — METHOCARBAMOL 500 MG/1
500 TABLET, FILM COATED ORAL 3 TIMES DAILY
Qty: 15 TABLET | Refills: 0 | Status: SHIPPED | OUTPATIENT
Start: 2021-12-26 | End: 2021-12-31

## 2021-12-26 RX ORDER — METHYLPREDNISOLONE 4 MG/1
TABLET ORAL
Qty: 21 TABLET | Refills: 0 | Status: SHIPPED | OUTPATIENT
Start: 2021-12-26 | End: 2022-01-01

## 2021-12-27 NOTE — ED PROVIDER NOTES
[address] [facility]  Department of Emergency Medicine   ED  Encounter Note  Admit Date/RoomTime: 2021  6:37 PM  ED Room:   NAME: Emma Ford  : 1950  MRN: [id number]     Chief Complaint:  Cough (weak, chills, cold/flu symptoms, back hurts)    HISTORY OF PRESENT ILLNESS        Emma Ford is a 66 y.o. male who presents to the ED with 2 separate complaints. Patient complains of flareup of his back pain. This is left-sided low back pain that is radiating into the buttock and down his thigh. Symptoms are moderate in severity. He has been taking Ultram for this discomfort. Patient complains of concern for having Covid illness. States for 2 days now he has been having body aches. Chills. Sinus congestion. Nasal drainage. And cough. Patient states he is also had an upset stomach. Denies any vomiting or diarrhea. ROS   Pertinent positives and negatives are stated within HPI, all other systems reviewed and are negative. Past Medical History:  has a past medical history of Acid reflux, Arthritis, Cancer ([address] History of cardiovascular stress test, Hypertension, and Sleep apnea. Surgical History:  has a past surgical history that includes joint replacement; Knee arthroscopy; hernia repair; Colonoscopy; Endoscopy, colon, diagnostic; skin biopsy; Cataract removal with implant (2012); Cataract removal with implant (12); and Carpal tunnel release. Social History:  reports that he has quit smoking. He has a 25.00 pack-year smoking history. He quit smokeless tobacco use about 39 years ago. He reports current alcohol use. He reports that he does not use drugs. Family History: family history includes Cancer in his mother; Diabetes in his father; Heart Disease in his paternal grandfather. Allergies: Latex and Vicodin [hydrocodone-acetaminophen]    PHYSICAL EXAM   Oxygen Saturation Interpretation: Normal on room air analysis.         ED Triage Vitals [12/26/21 1808]   BP Temp Temp src Pulse Resp SpO2 Height Weight   (!) 151/89 97.3 °F (36.3 °C) -- 85 16 98 % -- 240 lb (108.9 kg)       General:  NAD. Alert and Oriented. Well-appearing. Skin:  Warm, dry. No rashes. Head:  Normocephalic. Atraumatic. Eyes:  EOMI. Conjunctiva normal.  ENT:  Oral mucosa moist.  Airway patent. Posterior pharynx pink without erythema, without swelling, without exudate. Uvula midline with equal rise and without edema. Bilateral ear canals patent with minimal cerumen. Bilateral TM's without erythema, without bulging. Nasal turbinates with minimal swelling. Frontal and maxillary sinuses nontender to palpation. Neck:  Supple. Normal ROM. Respiratory:  No respiratory distress. No labored breathing. Lungs diminished at the bases without rales, rhonchi or wheezing. Cardiovascular:  Regular rate. No Murmur. No peripheral edema. Extremities warm and good color. Extremities:  Normal ROM. Nontender to palpation. Atraumatic. Back:  Normal ROM. Left-sided low back, around the left SI joint, tenderness to palpation. Neuro:  Alert and Oriented to person, place, time and situation. Normal LOC. Moves all extremities. Speech fluent. Psych:  Calm and Cooperative. Normal thought process. Normal judgement. Lab / Imaging Results   (All laboratory and radiology results have been personally reviewed by myself)  Labs:  No results found for this visit on 12/26/21. Imaging: All Radiology results interpreted by Radiologist unless otherwise noted. No orders to display       ED Course / Medical Decision Making   Medications - No data to display     Re-examination:  12/26/21       Time:   Patients condition . Consult(s):   None    Procedure(s):   none    MDM:   Covid test performed as a send out. Vitamins recommended during coronavirus will be listed on his discharge instructions. I will start patient on a Medrol Dosepak. Patient states he is a prediabetic.   Is on Glucophage. Understands if he has any concerns for elevated blood sugar he needs to follow-up with his doctor or go to the emergency room. Patient will continue the Ultram for his back pain. I will add on a muscle relaxer. Plan of Care/Counseling:  Physician Assistant on duty reviewed today's visit with the patient in addition to providing specific details for the plan of care and counseling regarding the diagnosis and prognosis. Questions are answered at this time and are agreeable with the plan. ASSESSMENT     1. Encounter for laboratory testing for COVID-19 virus New Problem   2. Viral URI with cough New Problem   3. Acute left-sided low back pain with left-sided sciatica New Problem     PLAN   Discharged home. Patient condition is good    New Medications     New Prescriptions    METHOCARBAMOL (ROBAXIN) 500 MG TABLET    Take 1 tablet by mouth 3 times daily for 5 days    METHYLPREDNISOLONE (MEDROL, SUAD,) 4 MG TABLET    Take as directed on package insert days 1-6    PSEUDOEPHEDRINE-GUAIFENESIN (MUCINEX D)  MG PER EXTENDED RELEASE TABLET    Take 1 tablet by mouth every 12 hours for 5 days     Electronically signed by SINDY Mullen   DD: 12/26/21  **This report was transcribed using voice recognition software. Every effort was made to ensure accuracy; however, inadvertent computerized transcription errors may be present.   END OF ED PROVIDER NOTE       Leonel Mullen  12/26/21 3079

## 2021-12-28 ENCOUNTER — CARE COORDINATION (OUTPATIENT)
Dept: CASE MANAGEMENT | Age: 71
End: 2021-12-28

## 2021-12-28 LAB — SARS-COV-2, PCR: DETECTED

## 2023-04-25 ENCOUNTER — HOSPITAL ENCOUNTER (EMERGENCY)
Age: 73
Discharge: HOME | End: 2023-04-26
Attending: EMERGENCY MEDICINE
Payer: MEDICARE

## 2023-04-25 ENCOUNTER — APPOINTMENT (OUTPATIENT)
Dept: CT IMAGING | Age: 73
End: 2023-04-25
Payer: MEDICARE

## 2023-04-25 DIAGNOSIS — M54.12 CERVICAL RADICULOPATHY: Primary | ICD-10-CM

## 2023-04-25 LAB
ANION GAP SERPL CALCULATED.3IONS-SCNC: 11 MMOL/L (ref 7–16)
BASOPHILS # BLD: 0.03 E9/L (ref 0–0.2)
BASOPHILS NFR BLD: 0.3 % (ref 0–2)
BUN SERPL-MCNC: 13 MG/DL (ref 6–23)
CALCIUM SERPL-MCNC: 9.3 MG/DL (ref 8.6–10.2)
CHLORIDE SERPL-SCNC: 104 MMOL/L (ref 98–107)
CO2 SERPL-SCNC: 25 MMOL/L (ref 22–29)
CREAT SERPL-MCNC: 0.9 MG/DL (ref 0.7–1.2)
EOSINOPHIL # BLD: 0.06 E9/L (ref 0.05–0.5)
EOSINOPHIL NFR BLD: 0.6 % (ref 0–6)
ERYTHROCYTE [DISTWIDTH] IN BLOOD BY AUTOMATED COUNT: 14.5 FL (ref 11.5–15)
GLUCOSE SERPL-MCNC: 125 MG/DL (ref 74–99)
HCT VFR BLD AUTO: 39.1 % (ref 37–54)
HGB BLD-MCNC: 13 G/DL (ref 12.5–16.5)
IMM GRANULOCYTES # BLD: 0.07 E9/L
IMM GRANULOCYTES NFR BLD: 0.7 % (ref 0–5)
LYMPHOCYTES # BLD: 1.33 E9/L (ref 1.5–4)
LYMPHOCYTES NFR BLD: 12.6 % (ref 20–42)
MCH RBC QN AUTO: 30.5 PG (ref 26–35)
MCHC RBC AUTO-ENTMCNC: 33.2 % (ref 32–34.5)
MCV RBC AUTO: 91.8 FL (ref 80–99.9)
MONOCYTES # BLD: 0.96 E9/L (ref 0.1–0.95)
MONOCYTES NFR BLD: 9.1 % (ref 2–12)
NEUTROPHILS # BLD: 8.1 E9/L (ref 1.8–7.3)
NEUTS SEG NFR BLD: 76.7 % (ref 43–80)
PLATELET # BLD AUTO: 239 E9/L (ref 130–450)
PMV BLD AUTO: 10.3 FL (ref 7–12)
POTASSIUM SERPL-SCNC: 4.1 MMOL/L (ref 3.5–5)
RBC # BLD AUTO: 4.26 E12/L (ref 3.8–5.8)
SODIUM SERPL-SCNC: 140 MMOL/L (ref 132–146)
TROPONIN, HIGH SENSITIVITY: 8 NG/L (ref 0–11)
WBC # BLD: 10.6 E9/L (ref 4.5–11.5)

## 2023-04-25 PROCEDURE — 99284 EMERGENCY DEPT VISIT MOD MDM: CPT

## 2023-04-25 PROCEDURE — 93005 ELECTROCARDIOGRAM TRACING: CPT | Performed by: EMERGENCY MEDICINE

## 2023-04-25 PROCEDURE — 84484 ASSAY OF TROPONIN QUANT: CPT

## 2023-04-25 PROCEDURE — 96375 TX/PRO/DX INJ NEW DRUG ADDON: CPT

## 2023-04-25 PROCEDURE — 85025 COMPLETE CBC W/AUTO DIFF WBC: CPT

## 2023-04-25 PROCEDURE — 96374 THER/PROPH/DIAG INJ IV PUSH: CPT

## 2023-04-25 PROCEDURE — 72125 CT NECK SPINE W/O DYE: CPT

## 2023-04-25 PROCEDURE — 6360000002 HC RX W HCPCS: Performed by: EMERGENCY MEDICINE

## 2023-04-25 PROCEDURE — 80048 BASIC METABOLIC PNL TOTAL CA: CPT

## 2023-04-25 RX ORDER — KETOROLAC TROMETHAMINE 15 MG/ML
15 INJECTION, SOLUTION INTRAMUSCULAR; INTRAVENOUS ONCE
Status: COMPLETED | OUTPATIENT
Start: 2023-04-25 | End: 2023-04-25

## 2023-04-25 RX ORDER — MORPHINE SULFATE 5 MG/ML
5 INJECTION, SOLUTION INTRAMUSCULAR; INTRAVENOUS ONCE
Status: COMPLETED | OUTPATIENT
Start: 2023-04-25 | End: 2023-04-25

## 2023-04-25 RX ADMIN — MORPHINE SULFATE 5 MG: 5 INJECTION, SOLUTION INTRAMUSCULAR; INTRAVENOUS at 22:53

## 2023-04-25 RX ADMIN — KETOROLAC TROMETHAMINE 15 MG: 15 INJECTION, SOLUTION INTRAMUSCULAR; INTRAVENOUS at 22:52

## 2023-04-25 ASSESSMENT — ENCOUNTER SYMPTOMS
NAUSEA: 0
SHORTNESS OF BREATH: 0
VOMITING: 0
ABDOMINAL PAIN: 0
WHEEZING: 0
SORE THROAT: 0
DIARRHEA: 0
BACK PAIN: 0
COUGH: 0
CHEST TIGHTNESS: 0

## 2023-04-25 ASSESSMENT — PAIN SCALES - GENERAL
PAINLEVEL_OUTOF10: 10
PAINLEVEL_OUTOF10: 9
PAINLEVEL_OUTOF10: 9

## 2023-04-25 ASSESSMENT — PAIN DESCRIPTION - ORIENTATION: ORIENTATION: UPPER

## 2023-04-25 ASSESSMENT — PAIN DESCRIPTION - LOCATION: LOCATION: BACK

## 2023-04-25 ASSESSMENT — PAIN - FUNCTIONAL ASSESSMENT: PAIN_FUNCTIONAL_ASSESSMENT: 0-10

## 2023-04-25 ASSESSMENT — LIFESTYLE VARIABLES: HOW OFTEN DO YOU HAVE A DRINK CONTAINING ALCOHOL: 2-4 TIMES A MONTH

## 2023-04-26 VITALS
HEART RATE: 73 BPM | WEIGHT: 245 LBS | BODY MASS INDEX: 35.15 KG/M2 | TEMPERATURE: 98.3 F | OXYGEN SATURATION: 95 % | DIASTOLIC BLOOD PRESSURE: 71 MMHG | SYSTOLIC BLOOD PRESSURE: 145 MMHG | RESPIRATION RATE: 18 BRPM

## 2023-04-26 LAB
EKG ATRIAL RATE: 87 BPM
EKG P AXIS: 61 DEGREES
EKG P-R INTERVAL: 146 MS
EKG Q-T INTERVAL: 356 MS
EKG QRS DURATION: 86 MS
EKG QTC CALCULATION (BAZETT): 428 MS
EKG R AXIS: 31 DEGREES
EKG T AXIS: 17 DEGREES
EKG VENTRICULAR RATE: 87 BPM

## 2023-04-26 PROCEDURE — 93010 ELECTROCARDIOGRAM REPORT: CPT | Performed by: INTERNAL MEDICINE

## 2023-04-26 RX ORDER — DEXAMETHASONE 6 MG/1
6 TABLET ORAL 2 TIMES DAILY WITH MEALS
Qty: 8 TABLET | Refills: 0 | Status: SHIPPED | OUTPATIENT
Start: 2023-04-26 | End: 2023-04-30

## 2023-04-26 RX ORDER — MELOXICAM 7.5 MG/1
7.5 TABLET ORAL DAILY PRN
Qty: 20 TABLET | Refills: 0 | Status: SHIPPED | OUTPATIENT
Start: 2023-04-26

## 2023-04-26 RX ORDER — TRAMADOL HYDROCHLORIDE 50 MG/1
50 TABLET ORAL EVERY 6 HOURS PRN
Qty: 6 TABLET | Refills: 0 | Status: SHIPPED | OUTPATIENT
Start: 2023-04-26 | End: 2023-05-02

## 2023-04-26 ASSESSMENT — PAIN - FUNCTIONAL ASSESSMENT: PAIN_FUNCTIONAL_ASSESSMENT: NONE - DENIES PAIN

## 2024-06-20 ENCOUNTER — APPOINTMENT (OUTPATIENT)
Dept: GENERAL RADIOLOGY | Age: 74
End: 2024-06-20
Payer: MEDICARE

## 2024-06-20 ENCOUNTER — HOSPITAL ENCOUNTER (EMERGENCY)
Age: 74
Discharge: HOME | End: 2024-06-21
Attending: EMERGENCY MEDICINE
Payer: MEDICARE

## 2024-06-20 ENCOUNTER — APPOINTMENT (OUTPATIENT)
Dept: CT IMAGING | Age: 74
End: 2024-06-20
Payer: MEDICARE

## 2024-06-20 VITALS
HEIGHT: 70 IN | RESPIRATION RATE: 16 BRPM | WEIGHT: 260 LBS | TEMPERATURE: 98.4 F | HEART RATE: 69 BPM | DIASTOLIC BLOOD PRESSURE: 81 MMHG | SYSTOLIC BLOOD PRESSURE: 163 MMHG | BODY MASS INDEX: 37.22 KG/M2 | OXYGEN SATURATION: 97 %

## 2024-06-20 DIAGNOSIS — N20.0 KIDNEY STONE: ICD-10-CM

## 2024-06-20 DIAGNOSIS — R10.9 LEFT FLANK PAIN: ICD-10-CM

## 2024-06-20 DIAGNOSIS — K80.20 GALLSTONES: ICD-10-CM

## 2024-06-20 DIAGNOSIS — R07.89 CHEST WALL PAIN: Primary | ICD-10-CM

## 2024-06-20 DIAGNOSIS — K57.90 DIVERTICULOSIS: ICD-10-CM

## 2024-06-20 DIAGNOSIS — R10.9 FLANK PAIN: ICD-10-CM

## 2024-06-20 LAB
ALBUMIN SERPL-MCNC: 4.6 G/DL (ref 3.5–5.2)
ALP SERPL-CCNC: 89 U/L (ref 40–129)
ALT SERPL-CCNC: 22 U/L (ref 0–40)
AMYLASE SERPL-CCNC: 26 U/L (ref 20–100)
ANION GAP SERPL CALCULATED.3IONS-SCNC: 12 MMOL/L (ref 7–16)
AST SERPL-CCNC: 14 U/L (ref 0–39)
BILIRUB DIRECT SERPL-MCNC: <0.2 MG/DL (ref 0–0.3)
BILIRUB INDIRECT SERPL-MCNC: NORMAL MG/DL (ref 0–1)
BILIRUB SERPL-MCNC: 0.3 MG/DL (ref 0–1.2)
BILIRUB UR QL STRIP: NEGATIVE
BNP SERPL-MCNC: 313 PG/ML (ref 0–450)
BUN SERPL-MCNC: 20 MG/DL (ref 6–23)
CALCIUM SERPL-MCNC: 10 MG/DL (ref 8.6–10.2)
CHLORIDE SERPL-SCNC: 102 MMOL/L (ref 98–107)
CK SERPL-CCNC: 72 U/L (ref 20–200)
CLARITY UR: CLEAR
CO2 SERPL-SCNC: 27 MMOL/L (ref 22–29)
COLOR UR: YELLOW
CREAT SERPL-MCNC: 0.9 MG/DL (ref 0.7–1.2)
D-DIMER QUANTITATIVE: 203 NG/ML DDU (ref 0–230)
ERYTHROCYTE [DISTWIDTH] IN BLOOD BY AUTOMATED COUNT: 14.1 % (ref 11.5–15)
GFR, ESTIMATED: 86 ML/MIN/1.73M2
GLUCOSE SERPL-MCNC: 122 MG/DL (ref 74–99)
GLUCOSE UR STRIP-MCNC: NEGATIVE MG/DL
HCT VFR BLD AUTO: 39 % (ref 37–54)
HGB BLD-MCNC: 13 G/DL (ref 12.5–16.5)
HGB UR QL STRIP.AUTO: NEGATIVE
INR PPP: 1.1
KETONES UR STRIP-MCNC: NEGATIVE MG/DL
LEUKOCYTE ESTERASE UR QL STRIP: NEGATIVE
LIPASE SERPL-CCNC: 15 U/L (ref 13–60)
MAGNESIUM SERPL-MCNC: 2.2 MG/DL (ref 1.6–2.6)
MCH RBC QN AUTO: 30.8 PG (ref 26–35)
MCHC RBC AUTO-ENTMCNC: 33.3 G/DL (ref 32–34.5)
MCV RBC AUTO: 92.4 FL (ref 80–99.9)
NITRITE UR QL STRIP: NEGATIVE
PH UR STRIP: 6 [PH] (ref 5–9)
PLATELET # BLD AUTO: 242 K/UL (ref 130–450)
PMV BLD AUTO: 9.8 FL (ref 7–12)
POTASSIUM SERPL-SCNC: 4 MMOL/L (ref 3.5–5)
PROT SERPL-MCNC: 7.2 G/DL (ref 6.4–8.3)
PROT UR STRIP-MCNC: NEGATIVE MG/DL
PROTHROMBIN TIME: 12.1 SEC (ref 9.3–12.4)
RBC # BLD AUTO: 4.22 M/UL (ref 3.8–5.8)
RBC #/AREA URNS HPF: NORMAL /HPF
SODIUM SERPL-SCNC: 141 MMOL/L (ref 132–146)
SP GR UR STRIP: 1.01 (ref 1–1.03)
TROPONIN I SERPL HS-MCNC: 9 NG/L (ref 0–11)
UROBILINOGEN UR STRIP-ACNC: 0.2 EU/DL (ref 0–1)
WBC #/AREA URNS HPF: NORMAL /HPF
WBC OTHER # BLD: 11.6 K/UL (ref 4.5–11.5)

## 2024-06-20 PROCEDURE — 6360000002 HC RX W HCPCS: Performed by: EMERGENCY MEDICINE

## 2024-06-20 PROCEDURE — 82150 ASSAY OF AMYLASE: CPT

## 2024-06-20 PROCEDURE — 83880 ASSAY OF NATRIURETIC PEPTIDE: CPT

## 2024-06-20 PROCEDURE — 85379 FIBRIN DEGRADATION QUANT: CPT

## 2024-06-20 PROCEDURE — 99285 EMERGENCY DEPT VISIT HI MDM: CPT

## 2024-06-20 PROCEDURE — 82550 ASSAY OF CK (CPK): CPT

## 2024-06-20 PROCEDURE — 71045 X-RAY EXAM CHEST 1 VIEW: CPT

## 2024-06-20 PROCEDURE — 83690 ASSAY OF LIPASE: CPT

## 2024-06-20 PROCEDURE — 2580000003 HC RX 258: Performed by: EMERGENCY MEDICINE

## 2024-06-20 PROCEDURE — 82248 BILIRUBIN DIRECT: CPT

## 2024-06-20 PROCEDURE — 93005 ELECTROCARDIOGRAM TRACING: CPT | Performed by: EMERGENCY MEDICINE

## 2024-06-20 PROCEDURE — 87086 URINE CULTURE/COLONY COUNT: CPT

## 2024-06-20 PROCEDURE — 96375 TX/PRO/DX INJ NEW DRUG ADDON: CPT

## 2024-06-20 PROCEDURE — 81001 URINALYSIS AUTO W/SCOPE: CPT

## 2024-06-20 PROCEDURE — 85027 COMPLETE CBC AUTOMATED: CPT

## 2024-06-20 PROCEDURE — 2500000003 HC RX 250 WO HCPCS: Performed by: EMERGENCY MEDICINE

## 2024-06-20 PROCEDURE — 80053 COMPREHEN METABOLIC PANEL: CPT

## 2024-06-20 PROCEDURE — 96374 THER/PROPH/DIAG INJ IV PUSH: CPT

## 2024-06-20 PROCEDURE — 74176 CT ABD & PELVIS W/O CONTRAST: CPT

## 2024-06-20 PROCEDURE — 83735 ASSAY OF MAGNESIUM: CPT

## 2024-06-20 PROCEDURE — 85610 PROTHROMBIN TIME: CPT

## 2024-06-20 PROCEDURE — 84484 ASSAY OF TROPONIN QUANT: CPT

## 2024-06-20 RX ORDER — KETOROLAC TROMETHAMINE 30 MG/ML
15 INJECTION, SOLUTION INTRAMUSCULAR; INTRAVENOUS ONCE
Status: COMPLETED | OUTPATIENT
Start: 2024-06-20 | End: 2024-06-20

## 2024-06-20 RX ORDER — ONDANSETRON 2 MG/ML
4 INJECTION INTRAMUSCULAR; INTRAVENOUS ONCE
Status: COMPLETED | OUTPATIENT
Start: 2024-06-20 | End: 2024-06-20

## 2024-06-20 RX ADMIN — ONDANSETRON 4 MG: 2 INJECTION INTRAMUSCULAR; INTRAVENOUS at 23:52

## 2024-06-20 RX ADMIN — KETOROLAC TROMETHAMINE 15 MG: 30 INJECTION, SOLUTION INTRAMUSCULAR at 23:51

## 2024-06-20 RX ADMIN — FAMOTIDINE 20 MG: 10 INJECTION, SOLUTION INTRAVENOUS at 23:50

## 2024-06-20 ASSESSMENT — PAIN - FUNCTIONAL ASSESSMENT: PAIN_FUNCTIONAL_ASSESSMENT: 0-10

## 2024-06-20 ASSESSMENT — PAIN SCALES - GENERAL
PAINLEVEL_OUTOF10: 5
PAINLEVEL_OUTOF10: 8

## 2024-06-20 ASSESSMENT — PAIN DESCRIPTION - LOCATION
LOCATION: FLANK
LOCATION: ABDOMEN

## 2024-06-20 ASSESSMENT — PAIN DESCRIPTION - ORIENTATION: ORIENTATION: LEFT

## 2024-06-20 ASSESSMENT — PAIN DESCRIPTION - DESCRIPTORS: DESCRIPTORS: ACHING;DISCOMFORT;DULL

## 2024-06-21 PROBLEM — K57.90 DIVERTICULOSIS: Status: ACTIVE | Noted: 2024-06-21

## 2024-06-21 PROBLEM — K80.20 GALLSTONES: Status: ACTIVE | Noted: 2024-06-21

## 2024-06-21 PROBLEM — R10.9 FLANK PAIN: Status: ACTIVE | Noted: 2024-06-21

## 2024-06-21 PROBLEM — R07.89 CHEST WALL PAIN: Status: ACTIVE | Noted: 2017-11-15

## 2024-06-21 PROBLEM — N20.0 KIDNEY STONE: Status: ACTIVE | Noted: 2024-06-21

## 2024-06-21 LAB
EKG ATRIAL RATE: 64 BPM
EKG P AXIS: 56 DEGREES
EKG P-R INTERVAL: 142 MS
EKG Q-T INTERVAL: 402 MS
EKG QRS DURATION: 102 MS
EKG QTC CALCULATION (BAZETT): 414 MS
EKG R AXIS: 8 DEGREES
EKG T AXIS: 29 DEGREES
EKG VENTRICULAR RATE: 64 BPM

## 2024-06-21 PROCEDURE — 93010 ELECTROCARDIOGRAM REPORT: CPT | Performed by: INTERNAL MEDICINE

## 2024-06-21 ASSESSMENT — PAIN - FUNCTIONAL ASSESSMENT: PAIN_FUNCTIONAL_ASSESSMENT: NONE - DENIES PAIN

## 2024-06-21 NOTE — ED PROVIDER NOTES
Radiologist.  XR CHEST PORTABLE   Final Result   No acute cardiopulmonary process.         CT ABDOMEN PELVIS WO CONTRAST Additional Contrast? None   Final Result   1. No acute intra-abdominal or pelvic abnormality.   2. Cholelithiasis.   3. Nonobstructing right nephrolithiasis.   4. Diverticulosis.   5. Normal appendix.               EKG Interpretation  Interpreted by emergency department physician    Rhythm: normal sinus   Rate: normal  Axis: normal  Conduction: normal  ST Segments: no acute change  T Waves: no acute change    Clinical Impression: no acute changes  Comparison to prior EKG: stable as compared to patient's most recent EKG      ------------------------- NURSING NOTES AND VITALS REVIEWED ---------------------------   The nursing notes within the ED encounter and vital signs as below have been reviewed by myself.  BP (!) 163/81   Pulse 69   Temp 98.4 °F (36.9 °C) (Oral)   Resp 16   Ht 1.778 m (5' 10\")   Wt 117.9 kg (260 lb)   SpO2 97%   BMI 37.31 kg/m²   Oxygen Saturation Interpretation: Normal    The patient’s available past medical records and past encounters were reviewed.        ------------------------------ ED COURSE/MEDICAL DECISION MAKING----------------------  Medications   ketorolac (TORADOL) injection 15 mg (15 mg IntraVENous Given 6/20/24 2351)   ondansetron (ZOFRAN) injection 4 mg (4 mg IntraVENous Given 6/20/24 2352)   famotidine (PEPCID) 20 mg in sodium chloride (PF) 0.9 % 10 mL injection (20 mg IntraVENous Given 6/20/24 2350)             Medical Decision Making:     Samuel Goldstein is a 73 y.o. male presenting to the ED for left lower rib pain, beginning days ago.  The complaint has been persistent, mild in severity, and worsened by nothing.  He denies any chest pain or shortness of breath he has no abdominal pain his pain in his ribs are reproducible there is been no trauma he is not tachycardic he is not hypoxic his blood pressure is stable his EKG was unremarkable his chest

## 2024-06-21 NOTE — DISCHARGE INSTRUCTIONS
Notes recorded by Josefina Ziegler MA on 3/19/2020 at 2:02 PM EDT  Patient returned call and notified his screening ct shows a couple of very small pulmonary nodules and that recommended f/u for these nodules is to repeat CT LDCT, diagnosti in 12 months, patient verbalized understanding, and states that he will schedule at f/u appt 7/8/20.  ------    Notes recorded by Shaneka Edwards MA on 3/19/2020 at 1:49 PM EDT  Patient called with message left for patient to call back to office. ------    Notes recorded by Brandon Orozco MD on 3/19/2020 at 11:04 AM EDT  Tell pt that his screening CT shows a couple of very small Pulmonary Nodules.  The current recommended f/u for these nodules is repeat CT LDCT, diagnostic, in 12 months. Please schedule. Return if chest pain shortness of breath abdominal pain fevers vomiting or any bleeding

## 2024-06-22 LAB
MICROORGANISM SPEC CULT: ABNORMAL
MICROORGANISM SPEC CULT: ABNORMAL
SPECIMEN DESCRIPTION: ABNORMAL

## 2024-07-13 ENCOUNTER — APPOINTMENT (OUTPATIENT)
Dept: GENERAL RADIOLOGY | Age: 74
End: 2024-07-13
Payer: MEDICARE

## 2024-07-13 ENCOUNTER — HOSPITAL ENCOUNTER (EMERGENCY)
Age: 74
Discharge: HOME | End: 2024-07-13
Payer: MEDICARE

## 2024-07-13 VITALS
WEIGHT: 260 LBS | RESPIRATION RATE: 24 BRPM | OXYGEN SATURATION: 96 % | DIASTOLIC BLOOD PRESSURE: 73 MMHG | HEART RATE: 76 BPM | BODY MASS INDEX: 37.31 KG/M2 | TEMPERATURE: 98.6 F | SYSTOLIC BLOOD PRESSURE: 161 MMHG

## 2024-07-13 DIAGNOSIS — M17.12 PRIMARY OSTEOARTHRITIS OF LEFT KNEE: Primary | ICD-10-CM

## 2024-07-13 PROCEDURE — 73562 X-RAY EXAM OF KNEE 3: CPT

## 2024-07-13 PROCEDURE — 99283 EMERGENCY DEPT VISIT LOW MDM: CPT

## 2024-07-13 RX ORDER — ACETAMINOPHEN 500 MG
1000 TABLET ORAL ONCE
Status: DISCONTINUED | OUTPATIENT
Start: 2024-07-13 | End: 2024-07-13 | Stop reason: HOSPADM

## 2024-07-13 RX ORDER — NAPROXEN 500 MG/1
500 TABLET ORAL 2 TIMES DAILY PRN
Qty: 28 TABLET | Refills: 0 | Status: SHIPPED | OUTPATIENT
Start: 2024-07-13

## 2024-07-13 ASSESSMENT — PAIN DESCRIPTION - ORIENTATION: ORIENTATION: LEFT

## 2024-07-13 ASSESSMENT — PAIN DESCRIPTION - DESCRIPTORS: DESCRIPTORS: SHARP;DISCOMFORT

## 2024-07-13 ASSESSMENT — PAIN SCALES - GENERAL: PAINLEVEL_OUTOF10: 7

## 2024-07-13 ASSESSMENT — PAIN DESCRIPTION - FREQUENCY: FREQUENCY: CONTINUOUS

## 2024-07-13 ASSESSMENT — PAIN DESCRIPTION - LOCATION: LOCATION: KNEE

## 2024-07-13 ASSESSMENT — PAIN - FUNCTIONAL ASSESSMENT: PAIN_FUNCTIONAL_ASSESSMENT: 0-10

## 2024-07-13 ASSESSMENT — PAIN DESCRIPTION - PAIN TYPE: TYPE: ACUTE PAIN

## 2024-07-13 ASSESSMENT — PAIN DESCRIPTION - ONSET: ONSET: ON-GOING

## 2024-07-14 NOTE — ED PROVIDER NOTES
[facility] EMERGENCY DEPARTMENT  ED  Encounter Note  Admit Date/RoomTime: 7/13/2024  5:36 PM  ED Room: [facility]  Independent RONEY Visit.   HPI:  7/13/24,   Time: 10:32 PM EDT         Samuel Goldstein is a 73 y.o. male presenting to the ED for left knee pain, beginning 2 weeks ago.  The complaint has been persistent, mild in severity, and worsened by nothing.  Presents for complaints of left knee pain which she states is been present for approximately 2 weeks but denies any known fall or known injury.  He states the pain became worse over the last couple days.  He denies any shortness of breath.  Denies any calf pain.    ROS:   Pertinent positives and negatives are stated within HPI, all other systems reviewed and are negative.  --------------------------------------------- PAST HISTORY ---------------------------------------------  Past Medical History:  has a past medical history of Acid reflux, Arthritis, Cancer (HCC), History of cardiovascular stress test, Hypertension, Kidney stone, and Sleep apnea.    Past Surgical History:  has a past surgical history that includes joint replacement; Knee arthroscopy; hernia repair; Colonoscopy; Endoscopy, colon, diagnostic; skin biopsy; Cataract removal with implant (04 25 2012); Cataract removal with implant (09-11-12); and Carpal tunnel release.    Social History:  reports that he has quit smoking. He has a 25.0 pack-year smoking history. He quit smokeless tobacco use about 41 years ago. He reports current alcohol use. He reports that he does not use drugs.    Family History: family history includes Cancer in his mother; Diabetes in his father; Heart Disease in his paternal grandfather.     The patient’s home medications have been reviewed.    Allergies: Latex and Vicodin [hydrocodone-acetaminophen]    -------------------------------------------------- RESULTS -------------------------------------------------  All laboratory and radiology

## 2024-08-12 ENCOUNTER — HOSPITAL ENCOUNTER (EMERGENCY)
Age: 74
Discharge: HOME | End: 2024-08-12
Attending: STUDENT IN AN ORGANIZED HEALTH CARE EDUCATION/TRAINING PROGRAM
Payer: MEDICARE

## 2024-08-12 VITALS
RESPIRATION RATE: 16 BRPM | SYSTOLIC BLOOD PRESSURE: 141 MMHG | WEIGHT: 260 LBS | BODY MASS INDEX: 37.31 KG/M2 | HEART RATE: 84 BPM | TEMPERATURE: 97.5 F | OXYGEN SATURATION: 97 % | DIASTOLIC BLOOD PRESSURE: 79 MMHG

## 2024-08-12 DIAGNOSIS — N30.01 ACUTE CYSTITIS WITH HEMATURIA: Primary | ICD-10-CM

## 2024-08-12 LAB
ALBUMIN SERPL-MCNC: 3.8 G/DL (ref 3.5–5.2)
ALP SERPL-CCNC: 82 U/L (ref 40–129)
ALT SERPL-CCNC: 16 U/L (ref 0–40)
ANION GAP SERPL CALCULATED.3IONS-SCNC: 12 MMOL/L (ref 7–16)
AST SERPL-CCNC: 15 U/L (ref 0–39)
BACTERIA URNS QL MICRO: ABNORMAL
BASOPHILS # BLD: 0.04 K/UL (ref 0–0.2)
BASOPHILS NFR BLD: 1 % (ref 0–2)
BILIRUB SERPL-MCNC: 0.3 MG/DL (ref 0–1.2)
BILIRUB UR QL STRIP: ABNORMAL
BUN SERPL-MCNC: 14 MG/DL (ref 6–23)
CALCIUM SERPL-MCNC: 9.2 MG/DL (ref 8.6–10.2)
CHLORIDE SERPL-SCNC: 103 MMOL/L (ref 98–107)
CLARITY UR: ABNORMAL
CO2 SERPL-SCNC: 24 MMOL/L (ref 22–29)
COLOR UR: YELLOW
CREAT SERPL-MCNC: 0.9 MG/DL (ref 0.7–1.2)
EOSINOPHIL # BLD: 0.19 K/UL (ref 0.05–0.5)
EOSINOPHILS RELATIVE PERCENT: 3 % (ref 0–6)
ERYTHROCYTE [DISTWIDTH] IN BLOOD BY AUTOMATED COUNT: 13.5 % (ref 11.5–15)
GFR, ESTIMATED: 90 ML/MIN/1.73M2
GLUCOSE SERPL-MCNC: 117 MG/DL (ref 74–99)
GLUCOSE UR STRIP-MCNC: NEGATIVE MG/DL
HCT VFR BLD AUTO: 36.9 % (ref 37–54)
HGB BLD-MCNC: 12.3 G/DL (ref 12.5–16.5)
HGB UR QL STRIP.AUTO: ABNORMAL
IMM GRANULOCYTES # BLD AUTO: 0.07 K/UL (ref 0–0.58)
IMM GRANULOCYTES NFR BLD: 1 % (ref 0–5)
KETONES UR STRIP-MCNC: ABNORMAL MG/DL
LEUKOCYTE ESTERASE UR QL STRIP: ABNORMAL
LYMPHOCYTES NFR BLD: 0.99 K/UL (ref 1.5–4)
LYMPHOCYTES RELATIVE PERCENT: 14 % (ref 20–42)
MCH RBC QN AUTO: 30 PG (ref 26–35)
MCHC RBC AUTO-ENTMCNC: 33.3 G/DL (ref 32–34.5)
MCV RBC AUTO: 90 FL (ref 80–99.9)
MONOCYTES NFR BLD: 0.58 K/UL (ref 0.1–0.95)
MONOCYTES NFR BLD: 8 % (ref 2–12)
NEUTROPHILS NFR BLD: 74 % (ref 43–80)
NEUTS SEG NFR BLD: 5.4 K/UL (ref 1.8–7.3)
NITRITE UR QL STRIP: NEGATIVE
PH UR STRIP: 6 [PH] (ref 5–9)
PLATELET # BLD AUTO: 204 K/UL (ref 130–450)
PMV BLD AUTO: 9.6 FL (ref 7–12)
POTASSIUM SERPL-SCNC: 4.2 MMOL/L (ref 3.5–5)
PROT SERPL-MCNC: 6.4 G/DL (ref 6.4–8.3)
PROT UR STRIP-MCNC: 100 MG/DL
RBC # BLD AUTO: 4.1 M/UL (ref 3.8–5.8)
RBC #/AREA URNS HPF: ABNORMAL /HPF
SODIUM SERPL-SCNC: 139 MMOL/L (ref 132–146)
SP GR UR STRIP: 1.02 (ref 1–1.03)
UROBILINOGEN UR STRIP-ACNC: 1 EU/DL (ref 0–1)
WBC #/AREA URNS HPF: ABNORMAL /HPF
WBC OTHER # BLD: 7.3 K/UL (ref 4.5–11.5)

## 2024-08-12 PROCEDURE — 87086 URINE CULTURE/COLONY COUNT: CPT

## 2024-08-12 PROCEDURE — 87088 URINE BACTERIA CULTURE: CPT

## 2024-08-12 PROCEDURE — 6370000000 HC RX 637 (ALT 250 FOR IP): Performed by: STUDENT IN AN ORGANIZED HEALTH CARE EDUCATION/TRAINING PROGRAM

## 2024-08-12 PROCEDURE — 85025 COMPLETE CBC W/AUTO DIFF WBC: CPT

## 2024-08-12 PROCEDURE — 80053 COMPREHEN METABOLIC PANEL: CPT

## 2024-08-12 PROCEDURE — 99283 EMERGENCY DEPT VISIT LOW MDM: CPT

## 2024-08-12 PROCEDURE — 81001 URINALYSIS AUTO W/SCOPE: CPT

## 2024-08-12 RX ORDER — CEFDINIR 300 MG/1
300 CAPSULE ORAL 2 TIMES DAILY
Qty: 14 CAPSULE | Refills: 0 | Status: SHIPPED | OUTPATIENT
Start: 2024-08-12 | End: 2024-08-19

## 2024-08-12 RX ORDER — CEFDINIR 300 MG/1
300 CAPSULE ORAL ONCE
Status: COMPLETED | OUTPATIENT
Start: 2024-08-12 | End: 2024-08-12

## 2024-08-12 RX ORDER — PANTOPRAZOLE SODIUM 40 MG/1
40 TABLET, DELAYED RELEASE ORAL
Qty: 30 TABLET | Refills: 0 | Status: SHIPPED | OUTPATIENT
Start: 2024-08-12

## 2024-08-12 RX ADMIN — CEFDINIR 300 MG: 300 CAPSULE ORAL at 17:25

## 2024-08-12 ASSESSMENT — PAIN DESCRIPTION - PAIN TYPE: TYPE: ACUTE PAIN

## 2024-08-12 ASSESSMENT — PAIN DESCRIPTION - LOCATION: LOCATION: PENIS

## 2024-08-12 ASSESSMENT — ENCOUNTER SYMPTOMS
COUGH: 0
NAUSEA: 0
BACK PAIN: 0
SORE THROAT: 0
PHOTOPHOBIA: 0
DIARRHEA: 0
ABDOMINAL PAIN: 0
SHORTNESS OF BREATH: 0

## 2024-08-12 ASSESSMENT — LIFESTYLE VARIABLES
HOW MANY STANDARD DRINKS CONTAINING ALCOHOL DO YOU HAVE ON A TYPICAL DAY: PATIENT DOES NOT DRINK
HOW OFTEN DO YOU HAVE A DRINK CONTAINING ALCOHOL: NEVER

## 2024-08-12 ASSESSMENT — PAIN DESCRIPTION - FREQUENCY: FREQUENCY: CONTINUOUS

## 2024-08-12 ASSESSMENT — PAIN - FUNCTIONAL ASSESSMENT: PAIN_FUNCTIONAL_ASSESSMENT: 0-10

## 2024-08-12 ASSESSMENT — PAIN DESCRIPTION - DESCRIPTORS: DESCRIPTORS: ACHING;BURNING;SHARP

## 2024-08-12 ASSESSMENT — PAIN DESCRIPTION - ONSET: ONSET: ON-GOING

## 2024-08-12 ASSESSMENT — PAIN SCALES - GENERAL: PAINLEVEL_OUTOF10: 10

## 2024-08-12 NOTE — ED PROVIDER NOTES
[facility] EMERGENCY DEPARTMENT  EMERGENCY DEPARTMENT ENCOUNTER        Pt Name: Samuel Goldstein  MRN: [id number]  Birthdate 1950  Date of evaluation: 8/12/2024  Provider: Marina Durham MD  PCP: Rhona Sepulveda MD  Note Started: 2:09 PM EDT 8/12/24    HPI  74 y.o. male presenting for hematuria, dysuria, melena.  Patient states overnight, he developed blood in his urine.  He complains of dysuria and states his urine is thick.  He also endorses dark stools for the past few weeks that he attributed to what he has been eating.  He is not on any blood thinners.  He takes naproxen.  He denies any fevers, chest pain, shortness of breath, abdominal pain, or flank pain      --------------------------------------------- PAST HISTORY ---------------------------------------------  Past Medical History:  has a past medical history of Acid reflux, Arthritis, Cancer (HCC), History of cardiovascular stress test, Hypertension, Kidney stone, and Sleep apnea.    Past Surgical History:  has a past surgical history that includes joint replacement; Knee arthroscopy; hernia repair; Colonoscopy; Endoscopy, colon, diagnostic; skin biopsy; Cataract removal with implant (04 25 2012); Cataract removal with implant (09-11-12); and Carpal tunnel release.    Social History:  reports that he has quit smoking. He has a 25 pack-year smoking history. He quit smokeless tobacco use about 41 years ago. He reports current alcohol use. He reports that he does not use drugs.    Family History: family history includes Cancer in his mother; Diabetes in his father; Heart Disease in his paternal grandfather.     The patient’s home medications have been reviewed.    Allergies: Latex and Vicodin [hydrocodone-acetaminophen]      Review of Systems   Constitutional:  Negative for chills and fever.   HENT:  Negative for congestion and sore throat.    Eyes:  Negative for photophobia and visual disturbance.   Respiratory:

## 2024-08-15 LAB
MICROORGANISM SPEC CULT: ABNORMAL
SERVICE CMNT-IMP: ABNORMAL
SPECIMEN DESCRIPTION: ABNORMAL

## 2025-01-08 ENCOUNTER — HOSPITAL ENCOUNTER (EMERGENCY)
Age: 75
Discharge: HOME | End: 2025-01-08
Attending: STUDENT IN AN ORGANIZED HEALTH CARE EDUCATION/TRAINING PROGRAM
Payer: MEDICARE

## 2025-01-08 ENCOUNTER — APPOINTMENT (OUTPATIENT)
Dept: GENERAL RADIOLOGY | Age: 75
End: 2025-01-08
Payer: MEDICARE

## 2025-01-08 ENCOUNTER — APPOINTMENT (OUTPATIENT)
Dept: CT IMAGING | Age: 75
End: 2025-01-08
Payer: MEDICARE

## 2025-01-08 VITALS
HEART RATE: 63 BPM | DIASTOLIC BLOOD PRESSURE: 64 MMHG | OXYGEN SATURATION: 97 % | TEMPERATURE: 97.4 F | WEIGHT: 250 LBS | BODY MASS INDEX: 35.87 KG/M2 | SYSTOLIC BLOOD PRESSURE: 138 MMHG | RESPIRATION RATE: 20 BRPM

## 2025-01-08 DIAGNOSIS — R06.00 DYSPNEA AND RESPIRATORY ABNORMALITIES: ICD-10-CM

## 2025-01-08 DIAGNOSIS — U07.1 COVID-19: Primary | ICD-10-CM

## 2025-01-08 DIAGNOSIS — R06.89 DYSPNEA AND RESPIRATORY ABNORMALITIES: ICD-10-CM

## 2025-01-08 LAB
ALBUMIN SERPL-MCNC: 4.3 G/DL (ref 3.5–5.2)
ALP SERPL-CCNC: 99 U/L (ref 40–129)
ALT SERPL-CCNC: 22 U/L (ref 0–40)
ANION GAP SERPL CALCULATED.3IONS-SCNC: 11 MMOL/L (ref 7–16)
AST SERPL-CCNC: 19 U/L (ref 0–39)
BASOPHILS # BLD: 0.02 K/UL (ref 0–0.2)
BASOPHILS NFR BLD: 0 % (ref 0–2)
BILIRUB SERPL-MCNC: 0.4 MG/DL (ref 0–1.2)
BNP SERPL-MCNC: 63 PG/ML (ref 0–450)
BUN SERPL-MCNC: 12 MG/DL (ref 6–23)
CALCIUM SERPL-MCNC: 9.7 MG/DL (ref 8.6–10.2)
CHLORIDE SERPL-SCNC: 103 MMOL/L (ref 98–107)
CO2 SERPL-SCNC: 29 MMOL/L (ref 22–29)
CREAT SERPL-MCNC: 0.9 MG/DL (ref 0.7–1.2)
D-DIMER QUANTITATIVE: 388 NG/ML DDU (ref 0–230)
EOSINOPHIL # BLD: 0.1 K/UL (ref 0.05–0.5)
EOSINOPHILS RELATIVE PERCENT: 2 % (ref 0–6)
ERYTHROCYTE [DISTWIDTH] IN BLOOD BY AUTOMATED COUNT: 14.2 % (ref 11.5–15)
FLUAV RNA RESP QL NAA+PROBE: NOT DETECTED
FLUBV RNA RESP QL NAA+PROBE: NOT DETECTED
GFR, ESTIMATED: 87 ML/MIN/1.73M2
GLUCOSE SERPL-MCNC: 98 MG/DL (ref 74–99)
HCT VFR BLD AUTO: 40.4 % (ref 37–54)
HGB BLD-MCNC: 13.4 G/DL (ref 12.5–16.5)
IMM GRANULOCYTES # BLD AUTO: 0.07 K/UL (ref 0–0.58)
IMM GRANULOCYTES NFR BLD: 1 % (ref 0–5)
LYMPHOCYTES NFR BLD: 1.42 K/UL (ref 1.5–4)
LYMPHOCYTES RELATIVE PERCENT: 21 % (ref 20–42)
MCH RBC QN AUTO: 29.2 PG (ref 26–35)
MCHC RBC AUTO-ENTMCNC: 33.2 G/DL (ref 32–34.5)
MCV RBC AUTO: 88 FL (ref 80–99.9)
MONOCYTES NFR BLD: 0.55 K/UL (ref 0.1–0.95)
MONOCYTES NFR BLD: 8 % (ref 2–12)
NEUTROPHILS NFR BLD: 67 % (ref 43–80)
NEUTS SEG NFR BLD: 4.49 K/UL (ref 1.8–7.3)
PLATELET # BLD AUTO: 202 K/UL (ref 130–450)
PMV BLD AUTO: 10 FL (ref 7–12)
POTASSIUM SERPL-SCNC: 4.2 MMOL/L (ref 3.5–5)
PROT SERPL-MCNC: 7.2 G/DL (ref 6.4–8.3)
RBC # BLD AUTO: 4.59 M/UL (ref 3.8–5.8)
SARS-COV-2 RNA RESP QL NAA+PROBE: DETECTED
SODIUM SERPL-SCNC: 143 MMOL/L (ref 132–146)
SOURCE: ABNORMAL
SPECIMEN DESCRIPTION: ABNORMAL
TROPONIN I SERPL HS-MCNC: 12 NG/L (ref 0–11)
TROPONIN I SERPL HS-MCNC: 12 NG/L (ref 0–11)
WBC OTHER # BLD: 6.7 K/UL (ref 4.5–11.5)

## 2025-01-08 PROCEDURE — 99285 EMERGENCY DEPT VISIT HI MDM: CPT

## 2025-01-08 PROCEDURE — 85379 FIBRIN DEGRADATION QUANT: CPT

## 2025-01-08 PROCEDURE — 6360000004 HC RX CONTRAST MEDICATION: Performed by: RADIOLOGY

## 2025-01-08 PROCEDURE — 87636 SARSCOV2 & INF A&B AMP PRB: CPT

## 2025-01-08 PROCEDURE — 71045 X-RAY EXAM CHEST 1 VIEW: CPT

## 2025-01-08 PROCEDURE — 80053 COMPREHEN METABOLIC PANEL: CPT

## 2025-01-08 PROCEDURE — 71275 CT ANGIOGRAPHY CHEST: CPT

## 2025-01-08 PROCEDURE — 85025 COMPLETE CBC W/AUTO DIFF WBC: CPT

## 2025-01-08 PROCEDURE — 83880 ASSAY OF NATRIURETIC PEPTIDE: CPT

## 2025-01-08 PROCEDURE — 93005 ELECTROCARDIOGRAM TRACING: CPT | Performed by: STUDENT IN AN ORGANIZED HEALTH CARE EDUCATION/TRAINING PROGRAM

## 2025-01-08 PROCEDURE — 6370000000 HC RX 637 (ALT 250 FOR IP): Performed by: STUDENT IN AN ORGANIZED HEALTH CARE EDUCATION/TRAINING PROGRAM

## 2025-01-08 PROCEDURE — 84484 ASSAY OF TROPONIN QUANT: CPT

## 2025-01-08 RX ORDER — BENZONATATE 100 MG/1
100 CAPSULE ORAL 3 TIMES DAILY PRN
Qty: 21 CAPSULE | Refills: 0 | Status: SHIPPED | OUTPATIENT
Start: 2025-01-08 | End: 2025-01-15

## 2025-01-08 RX ORDER — IPRATROPIUM BROMIDE AND ALBUTEROL SULFATE 2.5; .5 MG/3ML; MG/3ML
1 SOLUTION RESPIRATORY (INHALATION) ONCE
Status: COMPLETED | OUTPATIENT
Start: 2025-01-08 | End: 2025-01-08

## 2025-01-08 RX ORDER — IOPAMIDOL 755 MG/ML
75 INJECTION, SOLUTION INTRAVASCULAR
Status: COMPLETED | OUTPATIENT
Start: 2025-01-08 | End: 2025-01-08

## 2025-01-08 RX ADMIN — IOPAMIDOL 75 ML: 755 INJECTION, SOLUTION INTRAVENOUS at 17:46

## 2025-01-08 RX ADMIN — IPRATROPIUM BROMIDE AND ALBUTEROL SULFATE 1 DOSE: 2.5; .5 SOLUTION RESPIRATORY (INHALATION) at 17:56

## 2025-01-08 ASSESSMENT — ENCOUNTER SYMPTOMS
RHINORRHEA: 1
DIARRHEA: 0
EYE PAIN: 0
BACK PAIN: 0
COUGH: 1
NAUSEA: 0
EYE DISCHARGE: 0
EYE REDNESS: 0
SHORTNESS OF BREATH: 1
ABDOMINAL PAIN: 0
CHEST TIGHTNESS: 1
SINUS PRESSURE: 1
WHEEZING: 0
VOMITING: 0
SORE THROAT: 0

## 2025-01-08 ASSESSMENT — PAIN DESCRIPTION - DESCRIPTORS: DESCRIPTORS: ACHING;DISCOMFORT;DULL

## 2025-01-08 ASSESSMENT — PAIN - FUNCTIONAL ASSESSMENT: PAIN_FUNCTIONAL_ASSESSMENT: 0-10

## 2025-01-08 ASSESSMENT — PAIN SCALES - GENERAL: PAINLEVEL_OUTOF10: 4

## 2025-01-08 ASSESSMENT — PAIN DESCRIPTION - LOCATION: LOCATION: CHEST

## 2025-01-08 NOTE — ED PROVIDER NOTES
longer feeling short of breath or having chest discomfort.  CBC showing no leukocytosis, hemoglobin similar compared to prior, metabolic panel normal x-rays, renal function as well as liver enzymes.  Initial troponin of 12 with repeat of 12, no changes.  BNP within normal limits, he was positive for COVID on rapid assay still.  D-dimer was elevated at 388, CTA chest was therefore ordered.  He does have albuerol inhaler at home.   Ddimer elevated, however CT scan negative for pulmonary embolism; no infiltates on exam for pneumonia. Recommended supportive care at this time and close follow up with PCP. He was given return precautions.   He was ambulated in the department, did not become hypoxic.  Safe for outpatient follow-up and discharge.  Social Determinants of health impacting treatment or disposition: none  CODE status and Discussions: none    Patient continues to be non-toxic on re-evaluation. Findings were discussed with the patient and reasons to immediately return to the ED were articulated to them. They will follow-up with their PCP.   I am the Primary Clinician of Record.  --------------------------------------------- PAST HISTORY ---------------------------------------------  Past Medical History:  has a past medical history of Acid reflux, Arthritis, Cancer (HCC), History of cardiovascular stress test, Hypertension, Kidney stone, and Sleep apnea.    Past Surgical History:  has a past surgical history that includes joint replacement; Knee arthroscopy; hernia repair; Colonoscopy; Endoscopy, colon, diagnostic; skin biopsy; Cataract removal with implant (04 25 2012); Cataract removal with implant (09-11-12); and Carpal tunnel release.    Social History:  reports that he has quit smoking. He has a 25 pack-year smoking history. He quit smokeless tobacco use about 42 years ago. He reports current alcohol use. He reports that he does not use drugs.    Family History: family history includes Cancer in his mother;

## 2025-01-09 LAB
EKG ATRIAL RATE: 67 BPM
EKG P AXIS: 58 DEGREES
EKG P-R INTERVAL: 164 MS
EKG Q-T INTERVAL: 392 MS
EKG QRS DURATION: 90 MS
EKG QTC CALCULATION (BAZETT): 414 MS
EKG R AXIS: 8 DEGREES
EKG T AXIS: 26 DEGREES
EKG VENTRICULAR RATE: 67 BPM

## 2025-01-09 PROCEDURE — 93010 ELECTROCARDIOGRAM REPORT: CPT | Performed by: INTERNAL MEDICINE

## 2025-01-09 NOTE — DISCHARGE INSTRUCTIONS
CTA PULMONARY W CONTRAST   Final Result      1.  No evidence of acute pulmonary emboli.      2.  Tiny gallstone within an otherwise unremarkable appearing gallbladder.      3.  Fatty infiltration of the liver.      4.  Otherwise no acute pathology noted.         XR CHEST PORTABLE   Final Result   No acute process.      No significant interval change.           Use your inhaler the next two days, every 4 to 6 hours even if feeling okay.  If symptoms worsen or concern arises return for re-evaluation.  Follow up with your family physician, push fluids, supportive care.

## 2025-08-02 ENCOUNTER — HOSPITAL ENCOUNTER (EMERGENCY)
Age: 75
Discharge: HOME | End: 2025-08-02
Payer: MEDICARE

## 2025-08-02 VITALS
RESPIRATION RATE: 14 BRPM | DIASTOLIC BLOOD PRESSURE: 68 MMHG | SYSTOLIC BLOOD PRESSURE: 129 MMHG | HEART RATE: 68 BPM | OXYGEN SATURATION: 95 % | TEMPERATURE: 98.3 F

## 2025-08-02 DIAGNOSIS — T63.441A LOCAL REACTION TO BEE STING, ACCIDENTAL OR UNINTENTIONAL, INITIAL ENCOUNTER: Primary | ICD-10-CM

## 2025-08-02 PROCEDURE — 6360000002 HC RX W HCPCS: Performed by: NURSE PRACTITIONER

## 2025-08-02 PROCEDURE — 99284 EMERGENCY DEPT VISIT MOD MDM: CPT

## 2025-08-02 PROCEDURE — 96372 THER/PROPH/DIAG INJ SC/IM: CPT

## 2025-08-02 PROCEDURE — 6370000000 HC RX 637 (ALT 250 FOR IP): Performed by: NURSE PRACTITIONER

## 2025-08-02 RX ORDER — DEXAMETHASONE SODIUM PHOSPHATE 10 MG/ML
10 INJECTION, SOLUTION INTRA-ARTICULAR; INTRALESIONAL; INTRAMUSCULAR; INTRAVENOUS; SOFT TISSUE ONCE
Status: COMPLETED | OUTPATIENT
Start: 2025-08-02 | End: 2025-08-02

## 2025-08-02 RX ORDER — EPINEPHRINE 0.3 MG/.3ML
0.3 INJECTION SUBCUTANEOUS PRN
Qty: 2 EACH | Refills: 0 | Status: SHIPPED | OUTPATIENT
Start: 2025-08-02

## 2025-08-02 RX ORDER — CETIRIZINE HYDROCHLORIDE 10 MG/1
10 TABLET ORAL DAILY
Status: DISCONTINUED | OUTPATIENT
Start: 2025-08-02 | End: 2025-08-02 | Stop reason: HOSPADM

## 2025-08-02 RX ORDER — CETIRIZINE HYDROCHLORIDE 10 MG/1
10 TABLET ORAL DAILY
Qty: 30 TABLET | Refills: 0 | Status: SHIPPED | OUTPATIENT
Start: 2025-08-02

## 2025-08-02 RX ADMIN — CETIRIZINE HYDROCHLORIDE 10 MG: 10 TABLET, FILM COATED ORAL at 18:55

## 2025-08-02 RX ADMIN — DEXAMETHASONE SODIUM PHOSPHATE 10 MG: 10 INJECTION INTRAMUSCULAR; INTRAVENOUS at 18:56

## 2025-08-02 ASSESSMENT — PAIN SCALES - GENERAL
PAINLEVEL_OUTOF10: 7
PAINLEVEL_OUTOF10: 3

## 2025-08-02 ASSESSMENT — PAIN DESCRIPTION - ONSET: ONSET: ON-GOING

## 2025-08-02 ASSESSMENT — PAIN DESCRIPTION - ORIENTATION
ORIENTATION: RIGHT
ORIENTATION: RIGHT

## 2025-08-02 ASSESSMENT — PAIN DESCRIPTION - DESCRIPTORS
DESCRIPTORS: DISCOMFORT;SORE;ACHING
DESCRIPTORS: DISCOMFORT;SORE

## 2025-08-02 ASSESSMENT — PAIN DESCRIPTION - FREQUENCY: FREQUENCY: CONTINUOUS

## 2025-08-02 ASSESSMENT — PAIN - FUNCTIONAL ASSESSMENT: PAIN_FUNCTIONAL_ASSESSMENT: 0-10

## 2025-08-02 ASSESSMENT — PAIN DESCRIPTION - LOCATION
LOCATION: FINGER (COMMENT WHICH ONE)
LOCATION: FINGER (COMMENT WHICH ONE)

## 2025-08-02 ASSESSMENT — LIFESTYLE VARIABLES
HOW OFTEN DO YOU HAVE A DRINK CONTAINING ALCOHOL: NEVER
HOW MANY STANDARD DRINKS CONTAINING ALCOHOL DO YOU HAVE ON A TYPICAL DAY: PATIENT DOES NOT DRINK

## 2025-08-02 ASSESSMENT — PAIN DESCRIPTION - PAIN TYPE: TYPE: ACUTE PAIN

## 2025-08-02 NOTE — DISCHARGE INSTRUCTIONS
Tylenol 650 mg every 6-8 hours as needed for pain.  Continue taking Zyrtec 10 mg once daily.  Use EpiPen as indicated for anaphylactic reaction.

## 2025-08-02 NOTE — ED PROVIDER NOTES
Independent RONEY Visit.        [facility] EMERGENCY DEPARTMENT  ED  Encounter Note  Admit Date/RoomTime: 2025  6:09 PM  ED Room: [facility]  NAME: Samuel Goldstein  : 1950  MRN: [id number]  PCP: Rhona Sepulveda MD    CHIEF COMPLAINT     Insect Bite (Stung by bee on right thumb area today, thumb swollen pink, long time ago did have reaction to bee sting, not today)    HISTORY OF PRESENT ILLNESS        Samuel Goldstein is a 75 y.o. male who presents to the ED with complaints of right thumb pain and swelling that developed today.  Patient states he was stung on the right thumb by a wasp.  Patient states in the past he has had allergic reactions to bee stings.  Patient states he was stung approximately 2 hours ago.  Patient denies any chest tightness or sensation of throat swelling.  Patient states he does not feel itchy.  Patient states his only complaint is a slightly swollen right thumb.  Patient states he is currently out of his epinephrine pens and did not auto inject himself.  Patient states he is concerned as he has had reactions in the past that usually occur sometime after his bee sting.    REVIEW OF SYSTEMS     Pertinent positives and negatives are stated within HPI, all other systems reviewed and are negative.    Past Medical History:  has a past medical history of Acid reflux, Arthritis, Cancer (HCC), History of cardiovascular stress test, Hypertension, Kidney stone, and Sleep apnea.  Surgical History:  has a past surgical history that includes joint replacement; Knee arthroscopy; hernia repair; Colonoscopy; Endoscopy, colon, diagnostic; skin biopsy; Cataract removal with implant (2012); Cataract removal with implant (12); and Carpal tunnel release.  Social History:  reports that he has quit smoking. He has a 25 pack-year smoking history. He quit smokeless tobacco use about 42 years ago. He reports current alcohol use. He reports that he does not use